# Patient Record
Sex: FEMALE
[De-identification: names, ages, dates, MRNs, and addresses within clinical notes are randomized per-mention and may not be internally consistent; named-entity substitution may affect disease eponyms.]

---

## 2022-01-31 PROBLEM — Z00.00 ENCOUNTER FOR PREVENTIVE HEALTH EXAMINATION: Status: ACTIVE | Noted: 2022-01-31

## 2022-03-31 ENCOUNTER — APPOINTMENT (OUTPATIENT)
Dept: NEUROSURGERY | Facility: CLINIC | Age: 32
End: 2022-03-31
Payer: SELF-PAY

## 2022-03-31 ENCOUNTER — APPOINTMENT (OUTPATIENT)
Dept: NEUROSURGERY | Facility: CLINIC | Age: 32
End: 2022-03-31

## 2022-03-31 DIAGNOSIS — H93.A9 PULSATILE TINNITUS, UNSPECIFIED EAR: ICD-10-CM

## 2022-03-31 PROCEDURE — EDU01: CPT

## 2022-03-31 NOTE — HISTORY OF PRESENT ILLNESS
[de-identified] : Pulsatile tinnitus\par - initially on LEFT; started 3-4 years ago\par - eventually bilateral\par - bothersome especially at night\par - improves with neck pressure\par \par Headaches\par - occasional\par - no specific pattern\par \par Vision\par - evaluated by ophthalmologist in [place], no papilledema\par \par Prescribed Diamox but could not tolerate due to fatigue, pins and needles\par \par Describes episodes when she might faint\par \par MRI: unremarkable (limited images)\par MRA: unremarkable (limited images) [FreeTextEntry1] : Pulsatile Tinnitus

## 2022-03-31 NOTE — PLAN
[FreeTextEntry1] : Based on her symptoms, I suspect she has venous sinus stenosis.\par \par This needs to be investigated further with MRV Head so we can determine next steps\par \par PLAN\par MRV Head with and without contrast\par Follow-up after the MRV

## 2024-05-02 ENCOUNTER — APPOINTMENT (OUTPATIENT)
Dept: ANTEPARTUM | Facility: CLINIC | Age: 34
End: 2024-05-02
Payer: COMMERCIAL

## 2024-05-02 ENCOUNTER — ASOB RESULT (OUTPATIENT)
Age: 34
End: 2024-05-02

## 2024-05-02 PROCEDURE — 36415 COLL VENOUS BLD VENIPUNCTURE: CPT

## 2024-05-02 PROCEDURE — 76813 OB US NUCHAL MEAS 1 GEST: CPT

## 2024-05-02 PROCEDURE — 93976 VASCULAR STUDY: CPT

## 2024-06-20 ENCOUNTER — APPOINTMENT (OUTPATIENT)
Dept: ANTEPARTUM | Facility: CLINIC | Age: 34
End: 2024-06-20
Payer: COMMERCIAL

## 2024-06-20 ENCOUNTER — ASOB RESULT (OUTPATIENT)
Age: 34
End: 2024-06-20

## 2024-06-20 PROCEDURE — 76817 TRANSVAGINAL US OBSTETRIC: CPT

## 2024-06-20 PROCEDURE — 76811 OB US DETAILED SNGL FETUS: CPT

## 2024-08-15 ENCOUNTER — ASOB RESULT (OUTPATIENT)
Age: 34
End: 2024-08-15

## 2024-08-15 ENCOUNTER — APPOINTMENT (OUTPATIENT)
Dept: ANTEPARTUM | Facility: CLINIC | Age: 34
End: 2024-08-15
Payer: COMMERCIAL

## 2024-08-15 PROCEDURE — 76820 UMBILICAL ARTERY ECHO: CPT | Mod: 59

## 2024-08-15 PROCEDURE — 76816 OB US FOLLOW-UP PER FETUS: CPT

## 2024-08-15 PROCEDURE — 76819 FETAL BIOPHYS PROFIL W/O NST: CPT | Mod: 59

## 2024-09-05 ENCOUNTER — ASOB RESULT (OUTPATIENT)
Age: 34
End: 2024-09-05

## 2024-09-05 ENCOUNTER — APPOINTMENT (OUTPATIENT)
Dept: ANTEPARTUM | Facility: CLINIC | Age: 34
End: 2024-09-05
Payer: COMMERCIAL

## 2024-09-05 PROCEDURE — 76819 FETAL BIOPHYS PROFIL W/O NST: CPT | Mod: 59

## 2024-09-05 PROCEDURE — 76820 UMBILICAL ARTERY ECHO: CPT | Mod: 59

## 2024-09-05 PROCEDURE — 76816 OB US FOLLOW-UP PER FETUS: CPT

## 2024-09-19 ENCOUNTER — ASOB RESULT (OUTPATIENT)
Age: 34
End: 2024-09-19

## 2024-09-19 ENCOUNTER — APPOINTMENT (OUTPATIENT)
Dept: ANTEPARTUM | Facility: CLINIC | Age: 34
End: 2024-09-19
Payer: COMMERCIAL

## 2024-09-19 PROCEDURE — 76820 UMBILICAL ARTERY ECHO: CPT | Mod: 59

## 2024-09-19 PROCEDURE — 76819 FETAL BIOPHYS PROFIL W/O NST: CPT | Mod: 59

## 2024-09-19 PROCEDURE — 76816 OB US FOLLOW-UP PER FETUS: CPT

## 2024-10-03 ENCOUNTER — APPOINTMENT (OUTPATIENT)
Dept: ANTEPARTUM | Facility: CLINIC | Age: 34
End: 2024-10-03

## 2024-10-15 ENCOUNTER — ASOB RESULT (OUTPATIENT)
Age: 34
End: 2024-10-15

## 2024-10-15 ENCOUNTER — APPOINTMENT (OUTPATIENT)
Dept: ANTEPARTUM | Facility: CLINIC | Age: 34
End: 2024-10-15
Payer: COMMERCIAL

## 2024-10-15 PROCEDURE — 76821 MIDDLE CEREBRAL ARTERY ECHO: CPT | Mod: 59

## 2024-10-15 PROCEDURE — 76820 UMBILICAL ARTERY ECHO: CPT | Mod: 59

## 2024-10-15 PROCEDURE — 76816 OB US FOLLOW-UP PER FETUS: CPT

## 2024-10-15 PROCEDURE — 76819 FETAL BIOPHYS PROFIL W/O NST: CPT

## 2024-10-24 ENCOUNTER — ASOB RESULT (OUTPATIENT)
Age: 34
End: 2024-10-24

## 2024-10-24 ENCOUNTER — APPOINTMENT (OUTPATIENT)
Dept: ANTEPARTUM | Facility: CLINIC | Age: 34
End: 2024-10-24
Payer: COMMERCIAL

## 2024-10-24 PROCEDURE — 76815 OB US LIMITED FETUS(S): CPT

## 2024-10-24 PROCEDURE — 76819 FETAL BIOPHYS PROFIL W/O NST: CPT

## 2024-10-24 PROCEDURE — 76820 UMBILICAL ARTERY ECHO: CPT

## 2024-10-30 ENCOUNTER — INPATIENT (INPATIENT)
Facility: HOSPITAL | Age: 34
LOS: 3 days | Discharge: HOME | DRG: 833 | End: 2024-11-03
Attending: OBSTETRICS & GYNECOLOGY | Admitting: OBSTETRICS & GYNECOLOGY
Payer: COMMERCIAL

## 2024-10-30 ENCOUNTER — EMERGENCY (EMERGENCY)
Facility: HOSPITAL | Age: 34
LOS: 1 days | Discharge: HOME | End: 2024-10-30
Attending: EMERGENCY MEDICINE | Admitting: EMERGENCY MEDICINE
Payer: COMMERCIAL

## 2024-10-30 VITALS
TEMPERATURE: 98 F | WEIGHT: 151.02 LBS | HEIGHT: 61 IN | RESPIRATION RATE: 18 BRPM | OXYGEN SATURATION: 99 % | HEART RATE: 74 BPM | DIASTOLIC BLOOD PRESSURE: 90 MMHG | SYSTOLIC BLOOD PRESSURE: 133 MMHG

## 2024-10-30 VITALS
TEMPERATURE: 98 F | DIASTOLIC BLOOD PRESSURE: 87 MMHG | RESPIRATION RATE: 18 BRPM | SYSTOLIC BLOOD PRESSURE: 118 MMHG | OXYGEN SATURATION: 100 % | HEART RATE: 87 BPM

## 2024-10-30 DIAGNOSIS — O14.93 UNSPECIFIED PRE-ECLAMPSIA, THIRD TRIMESTER: ICD-10-CM

## 2024-10-30 DIAGNOSIS — O26.899 OTHER SPECIFIED PREGNANCY RELATED CONDITIONS, UNSPECIFIED TRIMESTER: ICD-10-CM

## 2024-10-30 DIAGNOSIS — Z3A.38 38 WEEKS GESTATION OF PREGNANCY: ICD-10-CM

## 2024-10-30 DIAGNOSIS — O99.891 OTHER SPECIFIED DISEASES AND CONDITIONS COMPLICATING PREGNANCY: ICD-10-CM

## 2024-10-30 DIAGNOSIS — R51.9 HEADACHE, UNSPECIFIED: ICD-10-CM

## 2024-10-30 LAB
ALBUMIN SERPL ELPH-MCNC: 3.7 G/DL — SIGNIFICANT CHANGE UP (ref 3.3–5)
ALP SERPL-CCNC: 187 U/L — HIGH (ref 40–120)
ALT FLD-CCNC: 9 U/L — LOW (ref 10–45)
ANION GAP SERPL CALC-SCNC: 11 MMOL/L — SIGNIFICANT CHANGE UP (ref 5–17)
ANISOCYTOSIS BLD QL: SLIGHT — SIGNIFICANT CHANGE UP
APTT BLD: 27.3 SEC — SIGNIFICANT CHANGE UP (ref 24.5–35.6)
AST SERPL-CCNC: 17 U/L — SIGNIFICANT CHANGE UP (ref 10–40)
BASOPHILS # BLD AUTO: 0.1 K/UL — SIGNIFICANT CHANGE UP (ref 0–0.2)
BASOPHILS NFR BLD AUTO: 1.8 % — SIGNIFICANT CHANGE UP (ref 0–2)
BILIRUB SERPL-MCNC: 0.3 MG/DL — SIGNIFICANT CHANGE UP (ref 0.2–1.2)
BLD GP AB SCN SERPL QL: NEGATIVE — SIGNIFICANT CHANGE UP
BLD GP AB SCN SERPL QL: NEGATIVE — SIGNIFICANT CHANGE UP
BLD GP AB SCN SERPL QL: POSITIVE — SIGNIFICANT CHANGE UP
BUN SERPL-MCNC: 13 MG/DL — SIGNIFICANT CHANGE UP (ref 7–23)
CALCIUM SERPL-MCNC: 9 MG/DL — SIGNIFICANT CHANGE UP (ref 8.4–10.5)
CHLORIDE SERPL-SCNC: 106 MMOL/L — SIGNIFICANT CHANGE UP (ref 96–108)
CO2 SERPL-SCNC: 18 MMOL/L — LOW (ref 22–31)
CREAT ?TM UR-MCNC: 25 MG/DL — SIGNIFICANT CHANGE UP
CREAT SERPL-MCNC: 0.79 MG/DL — SIGNIFICANT CHANGE UP (ref 0.5–1.3)
DACRYOCYTES BLD QL SMEAR: SLIGHT — SIGNIFICANT CHANGE UP
EGFR: 101 ML/MIN/1.73M2 — SIGNIFICANT CHANGE UP
EOSINOPHIL # BLD AUTO: 0.1 K/UL — SIGNIFICANT CHANGE UP (ref 0–0.5)
EOSINOPHIL NFR BLD AUTO: 1.8 % — SIGNIFICANT CHANGE UP (ref 0–6)
FIBRINOGEN PPP-MCNC: 545 MG/DL — HIGH (ref 200–445)
GIANT PLATELETS BLD QL SMEAR: PRESENT — SIGNIFICANT CHANGE UP
GLUCOSE SERPL-MCNC: 89 MG/DL — SIGNIFICANT CHANGE UP (ref 70–99)
HCT VFR BLD CALC: 37.5 % — SIGNIFICANT CHANGE UP (ref 34.5–45)
HGB BLD-MCNC: 12.3 G/DL — SIGNIFICANT CHANGE UP (ref 11.5–15.5)
HYPOCHROMIA BLD QL: SLIGHT — SIGNIFICANT CHANGE UP
INR BLD: 0.84 — LOW (ref 0.85–1.16)
LDH SERPL L TO P-CCNC: 207 U/L — SIGNIFICANT CHANGE UP (ref 50–242)
LYMPHOCYTES # BLD AUTO: 2.16 K/UL — SIGNIFICANT CHANGE UP (ref 1–3.3)
LYMPHOCYTES # BLD AUTO: 38.6 % — SIGNIFICANT CHANGE UP (ref 13–44)
MANUAL SMEAR VERIFICATION: SIGNIFICANT CHANGE UP
MCHC RBC-ENTMCNC: 28 PG — SIGNIFICANT CHANGE UP (ref 27–34)
MCHC RBC-ENTMCNC: 32.8 G/DL — SIGNIFICANT CHANGE UP (ref 32–36)
MCV RBC AUTO: 85.4 FL — SIGNIFICANT CHANGE UP (ref 80–100)
MICROCYTES BLD QL: SLIGHT — SIGNIFICANT CHANGE UP
MONOCYTES # BLD AUTO: 0.54 K/UL — SIGNIFICANT CHANGE UP (ref 0–0.9)
MONOCYTES NFR BLD AUTO: 9.6 % — SIGNIFICANT CHANGE UP (ref 2–14)
NEUTROPHILS # BLD AUTO: 2.69 K/UL — SIGNIFICANT CHANGE UP (ref 1.8–7.4)
NEUTROPHILS NFR BLD AUTO: 48.2 % — SIGNIFICANT CHANGE UP (ref 43–77)
OVALOCYTES BLD QL SMEAR: SIGNIFICANT CHANGE UP
PLAT MORPH BLD: ABNORMAL
PLATELET # BLD AUTO: 224 K/UL — SIGNIFICANT CHANGE UP (ref 150–400)
POIKILOCYTOSIS BLD QL AUTO: SIGNIFICANT CHANGE UP
POLYCHROMASIA BLD QL SMEAR: SLIGHT — SIGNIFICANT CHANGE UP
POTASSIUM SERPL-MCNC: 4.2 MMOL/L — SIGNIFICANT CHANGE UP (ref 3.5–5.3)
POTASSIUM SERPL-SCNC: 4.2 MMOL/L — SIGNIFICANT CHANGE UP (ref 3.5–5.3)
PROT ?TM UR-MCNC: 4 MG/DL — SIGNIFICANT CHANGE UP (ref 0–12)
PROT SERPL-MCNC: 7.4 G/DL — SIGNIFICANT CHANGE UP (ref 6–8.3)
PROT/CREAT UR-RTO: 0.2 RATIO — SIGNIFICANT CHANGE UP (ref 0–0.2)
PROTHROM AB SERPL-ACNC: 9.8 SEC — LOW (ref 9.9–13.4)
RBC # BLD: 4.39 M/UL — SIGNIFICANT CHANGE UP (ref 3.8–5.2)
RBC # FLD: 13.6 % — SIGNIFICANT CHANGE UP (ref 10.3–14.5)
RBC BLD AUTO: ABNORMAL
RH IG SCN BLD-IMP: POSITIVE — SIGNIFICANT CHANGE UP
RH IG SCN BLD-IMP: POSITIVE — SIGNIFICANT CHANGE UP
SODIUM SERPL-SCNC: 135 MMOL/L — SIGNIFICANT CHANGE UP (ref 135–145)
SPHEROCYTES BLD QL SMEAR: SLIGHT — SIGNIFICANT CHANGE UP
URATE SERPL-MCNC: 6.5 MG/DL — SIGNIFICANT CHANGE UP (ref 2.5–7)
WBC # BLD: 5.59 K/UL — SIGNIFICANT CHANGE UP (ref 3.8–10.5)
WBC # FLD AUTO: 5.59 K/UL — SIGNIFICANT CHANGE UP (ref 3.8–10.5)

## 2024-10-30 PROCEDURE — 99291 CRITICAL CARE FIRST HOUR: CPT

## 2024-10-30 RX ORDER — CHLORHEXIDINE GLUCONATE 40 MG/ML
1 SOLUTION TOPICAL DAILY
Refills: 0 | Status: DISCONTINUED | OUTPATIENT
Start: 2024-10-30 | End: 2024-10-31

## 2024-10-30 RX ORDER — OXYTOCIN IN D5W-0.2% SODIUM CL 15/250 ML
PLASTIC BAG, INJECTION (ML) INTRAVENOUS
Qty: 30 | Refills: 0 | Status: DISCONTINUED | OUTPATIENT
Start: 2024-10-30 | End: 2024-10-31

## 2024-10-30 RX ORDER — CITRIC ACID/SODIUM CITRATE 334-500MG
15 SOLUTION, ORAL ORAL EVERY 6 HOURS
Refills: 0 | Status: DISCONTINUED | OUTPATIENT
Start: 2024-10-30 | End: 2024-10-31

## 2024-10-30 RX ORDER — METOCLOPRAMIDE HCL 10 MG
10 TABLET ORAL ONCE
Refills: 0 | Status: COMPLETED | OUTPATIENT
Start: 2024-10-30 | End: 2024-10-30

## 2024-10-30 RX ORDER — DEXAMETHASONE 1.5 MG 1.5 MG/1
4 TABLET ORAL EVERY 6 HOURS
Refills: 0 | Status: DISCONTINUED | OUTPATIENT
Start: 2024-10-30 | End: 2024-10-31

## 2024-10-30 RX ORDER — OXYTOCIN IN D5W-0.2% SODIUM CL 15/250 ML
167 PLASTIC BAG, INJECTION (ML) INTRAVENOUS
Qty: 30 | Refills: 0 | Status: DISCONTINUED | OUTPATIENT
Start: 2024-10-30 | End: 2024-10-31

## 2024-10-30 RX ORDER — ACETAMINOPHEN 500 MG
1000 TABLET ORAL ONCE
Refills: 0 | Status: COMPLETED | OUTPATIENT
Start: 2024-10-30 | End: 2024-10-30

## 2024-10-30 RX ORDER — FENTANYL/BUPIVACAINE/NS/PF 2-1250MCG
250 SYRINGE (ML) EPIDURAL
Refills: 0 | Status: DISCONTINUED | OUTPATIENT
Start: 2024-10-30 | End: 2024-10-31

## 2024-10-30 RX ORDER — ONDANSETRON HYDROCHLORIDE 2 MG/ML
4 INJECTION, SOLUTION INTRAMUSCULAR; INTRAVENOUS EVERY 6 HOURS
Refills: 0 | Status: DISCONTINUED | OUTPATIENT
Start: 2024-10-30 | End: 2024-10-31

## 2024-10-30 RX ORDER — INFLUENZ VIR VAC TV P-SURF2003 15MCG/.5ML
0.5 SYRINGE (ML) INTRAMUSCULAR ONCE
Refills: 0 | Status: DISCONTINUED | OUTPATIENT
Start: 2024-10-30 | End: 2024-10-31

## 2024-10-30 RX ORDER — NALOXONE HYDROCHLORIDE 1 MG/ML
0.1 INJECTION, SOLUTION INTRAMUSCULAR; INTRAVENOUS; SUBCUTANEOUS
Refills: 0 | Status: DISCONTINUED | OUTPATIENT
Start: 2024-10-30 | End: 2024-10-31

## 2024-10-30 RX ORDER — METOCLOPRAMIDE HCL 10 MG
10 TABLET ORAL ONCE
Refills: 0 | Status: DISCONTINUED | OUTPATIENT
Start: 2024-10-30 | End: 2024-10-31

## 2024-10-30 RX ADMIN — Medication 2 MILLIUNIT(S)/MIN: at 14:45

## 2024-10-30 RX ADMIN — Medication 10 MILLIGRAM(S): at 10:00

## 2024-10-30 RX ADMIN — Medication 125 MILLILITER(S): at 02:15

## 2024-10-30 RX ADMIN — Medication 125 MILLILITER(S): at 01:51

## 2024-10-30 RX ADMIN — Medication 400 MILLIGRAM(S): at 04:45

## 2024-10-30 NOTE — ED ADULT NURSE NOTE - PATIENT'S PREFERRED PRONOUN
HUMIRA Biologic Injectable Administration     Additional History of Present Condition:  Patient is present for education and administration of Humira. Medication administration order placed . Provider order matches prescription order.     Assessment and Plan:  Based on a thorough discussion of this condition and the management approach to it (including a comprehensive discussion of the known risks, side effects and potential benefits of treatment), the patient (family) agrees to implement the following specific plan:  First  Humira injection administered today in the right abdomen  Verbal consent obtained to administer.   Next dose due 10/8, then 10/22   Patient provided education and training to continue to administer this at home.   Side effects discussed and how to report  Schedule 6 month follow up with ordering provider.       Biologic Injectable Administration Note  Diagnosis: Psoriasis  This is injection number 1    Informed consent: Discussed risks (Risks of hypersensitivity reaction, injection site reaction, conjunctivitis/keratitis, HSV reactivation, increased susceptibility to parasitic infections, inefficacy were reviewed.) Verbal consent obtained.   Preparation: After discussion potential procedure related risks including pain, bleeding, new infection, reactivation of latent infection, inefficacy, increased risk of malignancy, hypersensitivity reaction, injection site reaction, verbal consent was obtained. The areas were cleansed with alcohol prep pads and allowed to fully air dry for 3 minutes.  Procedure Details:  80mg was injected subcutaneously in the right abd  Lot Number: [id number]  Expiration: 09/01/2025  Total Injected: 80mg  NDC: 5946-2502-82     Patient tolerated procedure well, with minimal pinpoint bleeding that was controlled with pressure. Aftercare was reviewed.         IMPORTANT SAFETY INFORMATION   ABOUT HUMIRA® (adalimumab)1  What is the most important information I should know about  HUMIRA?  You should discuss the potential benefits and risks of HUMIRA with your doctor. HUMIRA is a TNF blocker medicine that can lower the ability of your immune system to fight infections. You should not start taking HUMIRA if you have any kind of infection unless your doctor says it is okay.  Serious infections have happened in people taking HUMIRA. These serious infections include tuberculosis (TB) and infections caused by viruses, fungi, or bacteria that have spread throughout the body. Some people have  from these infections. Your doctor should test you for TB before starting HUMIRA, and check you closely for signs and symptoms of TB during treatment with HUMIRA, even if your TB test was negative. If your doctor feels you are at risk, you may be treated with medicine for TB.  Cancer. For children and adults taking TNF blockers, including HUMIRA, the chance of getting lymphoma or other cancers may increase. There have been cases of unusual cancers in children, teenagers, and young adults using TNF blockers. Some people have developed a rare type of cancer called hepatosplenic T-cell lymphoma. This type of cancer often results in death. If using TNF blockers including HUMIRA, your chance of getting two types of skin cancer (basal cell and squamous cell) may increase. These types are generally not life-threatening if treated; tell your doctor if you have a bump or open sore that doesn’t heal.  What should I tell my doctor BEFORE starting HUMIRA?  Tell your doctor about all of your health conditions, including if you:  Have an infection, are being treated for infection, or have symptoms of an infection  Get a lot of infections or infections that keep coming back  Have diabetes  Have TB or have been in close contact with someone with TB, or were born in, lived in, or traveled where there is more risk for getting TB  Live or have lived in an area (such as the [place] and [facility]) where there is  an increased risk for getting certain kinds of fungal infections, such as histoplasmosis, coccidioidomycosis, or blastomycosis. These infections may happen or become more severe if you use HUMIRA. Ask your doctor if you are unsure if you have lived in these areas  Have or have had hepatitis B  Are scheduled for major surgery  Have or have had cancer  Have numbness or tingling or a nervous system disease such as multiple sclerosis or Guillain-Barré syndrome  Have or had heart failure  Have recently received or are scheduled to receive a vaccine. HUMIRA patients may receive vaccines, except for live vaccines. Children should be brought up to date on all vaccines before starting HUMIRA  Are allergic to rubber, latex, or any HUMIRA ingredients  Are pregnant, planning to become pregnant, breastfeeding, or planning to breastfeed  Have a baby and you were using HUMIRA during your pregnancy. Tell your baby’s doctor before your baby receives any vaccines  Also tell your doctor about all the medicines you take. You should not take HUMIRA with ORENCIA® (abatacept), KINERET® (anakinra), REMICADE® (infliximab), ENBREL® (etanercept), CIMZIA® (certolizumab pegol), or SIMPONI® (golimumab). Tell your doctor if you have ever used RITUXAN® (rituximab), IMURAN® (azathioprine), or PURINETHOL® (mercaptopurine, 6-MP).  What should I watch for AFTER starting HUMIRA?  HUMIRA can cause serious side effects, including:  Serious infections. These include TB and infections caused by viruses, fungi, or bacteria. Symptoms related to TB include a cough, low-grade fever, weight loss, or loss of body fat and muscle.  Hepatitis B infection in carriers of the virus. Symptoms include muscle aches, feeling very tired, dark urine, skin or eyes that look yellow, little or no appetite, vomiting, rogerio-colored bowel movements, fever, chills, stomach discomfort, and skin rash.  Allergic reactions. Symptoms of a serious allergic reaction include hives, trouble  breathing, and swelling of your face, eyes, lips, or mouth.  Nervous system problems. Signs and symptoms include numbness or tingling, problems with your vision, weakness in your arms or legs, and dizziness.  Blood problems (decreased blood cells that help fight infections or stop bleeding). Symptoms include a fever that does not go away, bruising or bleeding very easily, or looking very pale.  Heart failure (new or worsening). Symptoms include shortness of breath, swelling of your ankles or feet, and sudden weight gain.  Immune reactions including a lupus-like syndrome. Symptoms include chest discomfort or pain that does not go away, shortness of breath, joint pain, or rash on your cheeks or arms that gets worse in the sun.  Liver problems. Symptoms include feeling very tired, skin or eyes that look yellow, poor appetite or vomiting, and pain on the right side of your stomach (abdomen). These problems can lead to liver failure and death.  Psoriasis (new or worsening). Symptoms include red scaly patches or raised bumps that are filled with pus.  Call your doctor or get medical care right away if you develop any of the above symptoms.  Common side effects of HUMIRA include injection site reactions (pain, redness, rash, swelling, itching, or bruising), upper respiratory infections (sinus infections), headaches, rash, and nausea. These are not all of the possible side effects with HUMIRA. Tell your doctor if you have any side effect that bothers you or that does not go away.  Remember, tell your doctor right away if you have an infection or symptoms of an infection, including:  Fever, sweats, or chills  Muscle aches  Cough  Shortness of breath  Blood in phlegm  Weight loss  Warm, red, or painful skin or sores on your body  Diarrhea or stomach pain  Burning when you urinate  Urinating more often than normal  Feeling very tired  HUMIRA is given by injection under the skin.  This is the most important information to know  about HUMIRA. For more information, talk to your health care provider.  Uses  HUMIRA is a prescription medicine used:  To reduce the signs and symptoms of:  Moderate to severe rheumatoid arthritis (RA) in adults. HUMIRA can be used alone, with methotrexate, or with certain other medicines. HUMIRA may prevent further damage to your bones and joints and may help your ability to perform daily activities.  Moderate to severe polyarticular juvenile idiopathic arthritis (ULISSES) in children 2 years of age and older. HUMIRA can be used alone or with methotrexate.  Psoriatic arthritis (PsA) in adults. HUMIRA can be used alone or with certain other medicines. HUMIRA may prevent further damage to your bones and joints and may help your ability to perform daily activities.  Ankylosing spondylitis (AS) in adults.  Moderate to severe hidradenitis suppurativa (HS) in people 12 years and older.  To treat moderate to severe Crohn’s disease (CD) in adults and children 6 years of age and older.  To treat moderate to severe ulcerative colitis (UC) in adults and children 5 years of age and older. It is not known if HUMIRA is effective in people who stopped responding to or could not tolerate anti-TNF medicines.  To treat moderate to severe chronic plaque psoriasis (Ps) in adults who are ready for systemic therapy or phototherapy, and are under the care of a doctor who will decide if other systemic therapies are less appropriate.  To treat non-infectious intermediate (middle part of the eye), posterior (back of the eye), and panuveitis (all parts of the eye) in adults and children 2 years of age and older.  [id number]     Her/She

## 2024-10-30 NOTE — ED PROVIDER NOTE - CLINICAL SUMMARY MEDICAL DECISION MAKING FREE TEXT BOX
38-week pregnant female discussed with OB/GYN will send for further workup upstairs hemodynamically stable neurologically intact

## 2024-10-30 NOTE — ED ADULT TRIAGE NOTE - CHIEF COMPLAINT QUOTE
Elevated BP of 133/90 at home. pt. reports 38 weeks of pregnancy with due date on november 8. pt. denies pain, cramping, d/c or bleeding or swelling.

## 2024-10-30 NOTE — OB PROVIDER H&P - ATTENDING COMMENTS
Patient has been counseled multiple times that the standard of care after this type of deceleration would be to induce as she is full term and has multiple decelerations at this time. Pt has denied induction, tylenol, reglan and was observed overnight.  At 4 am we spoke and I explained why tylenol at least was important and patient consented. She continues to decline induction and was offered continued monitoring. Hopeful that she will change her mind. Will follow closely with residents and nursing. If patient leaves she was told that she does need to sign out AMA

## 2024-10-30 NOTE — ED PROVIDER NOTE - PATIENT PORTAL LINK FT
You can access the FollowMyHealth Patient Portal offered by [facility] by registering at the following website: [email or web address]. By joining [employer]’s FollowMyHealth portal, you will also be able to view your health information using other applications (apps) compatible with our system.

## 2024-10-30 NOTE — OB PROVIDER TRIAGE NOTE - HISTORY OF PRESENT ILLNESS
HPI: 33 yo  at 38w5d presenting with    ANTE: Spontaneous pregnancy. NIPT and anatomy scan WNL, L renal pyelectasis measuring 7.4mm noted on U/S 10/24. GCT passed. GBS negative. Denies HTN or thyroid disorders. EFW 2668g by U/S on 10/15, 3200g by leopolds today.    OB: G1 - current  GynHx: Denies fibroids, ovarian cysts, STI's, or abnormal PAP.  PMHx: Denies  PSurgHx: Denies  Medications: PNV  ALL: NKDA    BP: 118-122/87  HR: 81-92  Gen: NAD, A&Ox3  Abd: non-tender, gravid  LE: no swelling or pitting edema  Skin: no excoriations or rashes  Pulm: no increased WOB  SVE:    FHT: Cat 1, FHR bpm, moderate variability, + accels, - decels.  [place]: [place] q  TAUS: Cephalic, anterior/posterior placenta. U/S printed and attached to paper chart.     A/P: 33 yo  at 38w5d presenting for r/o PEC.  - NST reactive and reassuring  - BPP  - BP's are normotensive here, MR x1 in the ED  - PEC labs pending      **INCOMPLETE NOTE**  D/W  Eliane Leal PA-C HPI: 33 yo  at 38w5d presenting with a HA that started this morning. Patient states that she did not take tylenol for relief and it still persists at a 5/10. Patient was seen in the ED and found to have a BP of 130/90 and was sent to OB triage for evaluation. Endorses FM. Denies VB, LOF, ctx, hx of elevated BP's, CP/COB, RUQ pain, acute LE edema, or visual changes. Upon patient interview, a 2 minute deceleration was noted, patient examined and was 1/long, repositioned to her L then R side, IV inserted, IVF started, and was confirmed vertex presentation on U/S.     ANTE: Spontaneous pregnancy. NIPT and anatomy scan WNL, L renal pyelectasis measuring 7.4mm noted on U/S 10/24. GCT passed. GBS negative. Denies HTN or thyroid disorders. EFW 2668g by U/S on 10/15, 3200g by leopolds today.    OB: G1 - current  GynHx: 2 cm anterior subserosal/intramural fibroid. Deniesovarian cysts, STI's, or abnormal PAP.  PMHx: Denies  PSurgHx: Denies  Medications: PNV, ASA until 36 weeks, PO iron  ALL: NKDA    BP: 118-130/93  HR: 81-92  Gen: NAD, A&Ox3  Abd: non-tender, gravid  LE: no swelling or pitting edema  Skin: no excoriations or rashes  Pulm: no increased WOB  SVE: 1/long    FHT: Cat 2,  bpm, moderate variability overall reassuring, + accels reactive, + late decels with return to FHR baseline with repositioning.  [place]: Uterine irritability  TAUS: Cephalic, anterior/posterior placenta. U/S printed and attached to paper chart.     A/P: 33 yo  at 38w5d presenting for r/o PEC, found to have a NRFHT while in triage.   - Plan: Admit to L&D  - PEC labs pending    D/W Dr. Trey Leal PA-C

## 2024-10-30 NOTE — ED PROVIDER NOTE - CRITICAL CARE ATTENDING CONTRIBUTION TO CARE
critical Care Procedure Note  Authorized and Performed by:   DO [facility] [place]  Total critical care time: Approximately 36 minutes  Due to a high probability of clinically significant, life threatening deterioration, the patient required my highest level of preparedness to intervene emergently and I personally spent this critical care time directly and personally managing the patient. This critical care time included obtaining a history; examining the patient; pulse oximetry; ordering and review of studies; arranging urgent treatment with development of a management plan; evaluation of patient's response to treatment; frequent reassessment; and, discussions with other providers.  This critical care time was performed to assess and manage the high probability of imminent, life-threatening deterioration that could result in multi-organ failure. It was exclusive of separately billable procedures and treating other patients and teaching time.  Please see MDM section and the rest of the note for further information on patient assessment and treatment.

## 2024-10-30 NOTE — OB RN TRIAGE NOTE - TEMPERATURE IN CELSIUS (DEGREES C)
36.5 Cellcept Counseling:  I discussed with the patient the risks of mycophenolate mofetil including but not limited to infection/immunosuppression, GI upset, hypokalemia, hypercholesterolemia, bone marrow suppression, lymphoproliferative disorders, malignancy, GI ulceration/bleed/perforation, colitis, interstitial lung disease, kidney failure, progressive multifocal leukoencephalopathy, and birth defects.  The patient understands that monitoring is required including a baseline creatinine and regular CBC testing. In addition, patient must alert us immediately if symptoms of infection or other concerning signs are noted.

## 2024-10-30 NOTE — OB RN DELIVERY SUMMARY - NS_SEPSISRSKCALC_OBGYN_ALL_OB_FT
EOS calculated successfully. EOS Risk Factor: 0.5/1000 live births ([facility] national incidence); GA=38w6d; Temp=100.2; ROM=21.333; GBS='Negative'; Antibiotics='No antibiotics or any antibiotics < 2 hrs prior to birth'

## 2024-10-30 NOTE — ED ADULT NURSE NOTE - NSFALLRISKASMT_ED_ALL_ED_DT
DISPLAY PLAN FREE TEXT DISPLAY PLAN FREE TEXT DISPLAY PLAN FREE TEXT DISPLAY PLAN FREE TEXT 30-Oct-2024 01:04

## 2024-10-30 NOTE — OB PROVIDER LABOR PROGRESS NOTE - NS_SUBJECTIVE/OBJECTIVE_OBGYN_ALL_OB_FT
FHT reviewed  Baseline 125 bpm, moderate variability, +accels, -decels  [place]: 1 ctx q10 min  Patient declined induction all morning. She was admitted from triage after having prolonged deceleration of 4 min.   No intervention done so far, FHT overall reactive and reassuring  Plan to start IOL with cook balloon and pitocin if patient agrees

## 2024-10-30 NOTE — OB PROVIDER LABOR PROGRESS NOTE - NS_SUBJECTIVE/OBJECTIVE_OBGYN_ALL_OB_FT
Pt seen at bedside for post epidural decel  Cervical balloon out, SVE 5-6/70/-3, likely SROM at 1740 clear fluid  BP 70s/40s, anesthesia at bedside, given ephedrine   Pt repositioned to hands and knees with return to baseline   Dr. Rene at bedside   EFM remains with moderate variability. No accels. Decel 5-6min tp a marbella of 70 bpm. Noted to be karishma. Terbutaline brought into room but not given.   Dr. Yang aware.

## 2024-10-30 NOTE — OB PROVIDER H&P - HISTORY OF PRESENT ILLNESS
HPI: 33 yo  at 38w5d presenting with a HA that started this morning. Patient states that she did not take Tylenol for relief and it still persists at a 5/10. Patient was seen in the ED and found to have a BP of 130/90 and was sent to OB triage for evaluation. Endorses FM. Denies VB, LOF, ctx, hx of elevated BP's, CP/COB, RUQ pain, acute LE edema, or visual changes. Upon patient interview, a 2 minute deceleration was noted, patient examined and was 1/long, repositioned to her L then R side, IV inserted, IVF started, and was confirmed vertex presentation on U/S.     ANTE: Spontaneous pregnancy. NIPT and anatomy scan WNL, L renal pyelectasis measuring 7.4mm noted on U/S 10/24. GCT passed. GBS negative. Denies HTN or thyroid disorders. EFW 2668g by U/S on 10/15, 3200g by leopolds today.    OB: G1 - current  GynHx: 2 cm anterior subserosal/intramural fibroid. Deniesovarian cysts, STI's, or abnormal PAP.  PMHx: Denies  PSurgHx: Denies  Medications: PNV, ASA until 36 weeks, PO iron  ALL: NKDA    BP: 118-130/93  HR: 81-92  Gen: NAD, A&Ox3  Abd: non-tender, gravid  LE: no swelling or pitting edema  Skin: no excoriations or rashes  Pulm: no increased WOB  SVE: 1/long    FHT: Cat 2,  bpm, moderate variability overall reassuring, + accels reactive, + late decels with return to FHR baseline with repositioning.  [place]: Uterine irritability  TAUS: Cephalic, anterior/posterior placenta. U/S printed and attached to paper chart.     A/P: 33 yo  at 38w5d presenting for r/o PEC, found to have a NRFHT while in triage.   - IV fluids, NPO  - Admit to L&D  - Continuous FHT and toco monitoring. Currently reactive and reassuring.  - Prenatals reviewed. GBS negative. No indication for antibiotic prophylaxis.  - r/o PEC: Full PEC labs pending, ofirmev and reglan ordered for HA, monitor BP's  - Plan: Evaluate FHT. With Cat 1 tracing proceed with IOL for NRFHT with kinney balloon and pitocin, epidural upon patient request.  - Risks, benefits, and alternatives discussed. Consents obtained.    D/W Dr. Trey Leal, CEM    D/W TOY De GuzmanC HPI: 33 yo  at 38w5d presenting with a HA that started this morning. Patient states that she did not take Tylenol for relief and it still persists at a 5/10. Patient was seen in the ED and found to have a BP of 130/90 and was sent to OB triage for evaluation. Endorses FM. Denies VB, LOF, ctx, hx of elevated BP's, CP/COB, RUQ pain, acute LE edema, or visual changes. Upon patient interview, a 2 minute deceleration was noted, patient examined and was 1/long, repositioned to her L then R side, IV inserted, IVF started, and was confirmed vertex presentation on U/S.     ANTE: Spontaneous pregnancy. NIPT and anatomy scan WNL, L renal pyelectasis measuring 7.4mm noted on U/S 10/24. GCT passed. GBS negative. Denies HTN or thyroid disorders. EFW 2668g by U/S on 10/15, 3000g by leopolds today.    OB: G1 - current  GynHx: 2 cm anterior subserosal/intramural fibroid. Deniesovarian cysts, STI's, or abnormal PAP.  PMHx: Denies  PSurgHx: Denies  Medications: PNV, ASA until 36 weeks, PO iron  ALL: NKDA    BP: 118-130/93  HR: 81-92  Gen: NAD, A&Ox3  Abd: non-tender, gravid  LE: no swelling or pitting edema  Skin: no excoriations or rashes  Pulm: no increased WOB  SVE: 1/long    FHT: Cat 2,  bpm, moderate variability overall reassuring, + accels reactive, + late decels with return to FHR baseline with repositioning.  [place]: Uterine irritability  TAUS: Cephalic, anterior/posterior placenta. U/S printed and attached to paper chart.     A/P: 33 yo  at 38w5d presenting for r/o PEC, found to have a NRFHT while in triage.   - IV fluids, NPO  - Admit to L&D  - Continuous FHT and toco monitoring. Currently reactive and reassuring.  - Prenatals reviewed. GBS negative. No indication for antibiotic prophylaxis.  - r/o PEC: Full PEC labs pending, ofirmev and reglan ordered for HA, monitor BP's  - Plan: Evaluate FHT. With Cat 1 tracing proceed with IOL for NRFHT with kinney balloon and pitocin, epidural upon patient request.  - Risks, benefits, and alternatives discussed. Consents obtained.    D/W Dr. Trey Leal, CEM    D/W TOY De GuzmanC HPI: 35 yo  at 38w5d presenting with a HA that started this morning. Patient states that she did not take Tylenol for relief and it still persists at a 5/10. Patient was seen in the ED and found to have a BP of 130/90 and was sent to OB triage for evaluation. Endorses FM. Denies VB, LOF, ctx, hx of elevated BP's, CP/COB, RUQ pain, acute LE edema, or visual changes. Upon patient interview, a 2 minute deceleration was noted, patient examined and was 1/long, repositioned to her L then R side, IV inserted, IVF started, and was confirmed vertex presentation on U/S.     ANTE: Spontaneous pregnancy. NIPT and anatomy scan WNL, L renal pyelectasis measuring 7.4mm noted on U/S 10/24. GCT passed. GBS negative. Denies HTN or thyroid disorders. EFW 2668g by U/S on 10/15, 3000g by [employer] today.    OB: G1 - current  GynHx: 2 cm anterior subserosal/intramural fibroid. Deniesovarian cysts, STI's, or abnormal PAP.  PMHx: Denies  PSurgHx: Denies  Medications: PNV, ASA until 36 weeks, PO iron  ALL: NKDA    BP: 118-130/93  HR: 81-92  Gen: NAD, A&Ox3  Abd: non-tender, gravid  LE: no swelling or pitting edema  Skin: no excoriations or rashes  Pulm: no increased WOB  SVE: 1/long    FHT: Cat 2,  bpm, moderate variability overall reassuring, + accels reactive, + late decels with return to FHR baseline with repositioning.  [place]: Uterine irritability  TAUS: Cephalic, anterior placenta. ROBINA 11 cm. U/S printed and attached to paper chart.     A/P: 35 yo  at 38w5d presenting for r/o PEC, found to have a NRFHT while in triage.   - IV fluids, NPO  - Admit to L&D  - Continuous FHT and toco monitoring. Currently reactive and reassuring.  - Prenatals reviewed. GBS negative. No indication for antibiotic prophylaxis.  - r/o PEC: Full PEC labs pending, ofirmev and reglan ordered for HA, monitor BP's  - Plan: Evaluate FHT. With Cat 1 tracing proceed with IOL for NRFHT with kinney balloon and pitocin, epidural upon patient request.  - Risks, benefits, and alternatives discussed. Consents obtained.    D/W Dr. Trey Leal PA-C HPI: 33 yo  at 38w5d presenting with a HA that started this morning. Patient states that she did not take Tylenol for relief and it has worsened when she tried to go to bed, persisting at a "pounding" 5/10, /90mmHg at home. Patient was seen in the ED and found to have a BP of 130/90 mmHg and was sent to OB triage for evaluation. Endorses FM. Denies VB, LOF, ctx, hx of elevated BP's, CP/COB, RUQ pain, acute LE edema, or visual changes. Upon patient interview, a 3 minute deceleration was noted, patient examined and was 1/long, repositioned to her L then R side, IV inserted, IVF started, and was confirmed vertex presentation on U/S.     ANTE: Spontaneous pregnancy. NIPT and anatomy scan WNL, L renal pyelectasis measuring 7.4mm noted on U/S 10/24. GCT passed. GBS negative. Denies HTN or thyroid disorders. EFW 2668g by U/S on 10/15, 3000g by leopolds today.    OB: G1 - current  GynHx: 2 cm anterior subserosal/intramural fibroid. Deniesovarian cysts, STI's, or abnormal PAP.  PMHx: Denies  PSurgHx: Denies  Medications: PNV, ASA until 36 weeks, PO iron  ALL: NKDA    BP: 118-130/93  HR: 81-92  Gen: NAD, A&Ox3  Abd: non-tender, gravid  LE: no swelling or pitting edema  Skin: no excoriations or rashes  Pulm: no increased WOB  SVE: 1/long    FHT: Cat 2,  bpm, moderate variability overall reassuring, + accels reactive, + late decels with return to FHR baseline with repositioning.  [place]: Uterine irritability  TAUS: Cephalic, anterior placenta. ROBINA 11 cm. U/S printed and attached to paper chart.     A/P: 33 yo  at 38w5d presenting for r/o PEC, found to have a NRFHT while in triage.   - IV fluids, NPO  - Admit to L&D  - Continuous FHT and toco monitoring. Currently reactive and reassuring.  - Prenatals reviewed. GBS negative. No indication for antibiotic prophylaxis.  - r/o PEC: Full PEC labs pending, ofirmev and reglan ordered for HA, monitor BP's  - Plan: Evaluate FHT. With Cat 1 tracing proceed with IOL for NRFHT with kinney balloon and pitocin, epidural upon patient request.  - Risks, benefits, and alternatives discussed. Consents obtained.    D/W TOY De GuzmanC HPI: 35 yo  at 38w5d presenting with a HA that started this morning. Patient states that she did not take Tylenol for relief and it has worsened when she tried to go to bed at 23:30, persisting at a "pounding" 5/10, /90mmHg  at home. Patient was seen in the ED and found to have a BP of 130/90 mmHg and was sent to OB triage for evaluation. Endorses FM. Denies VB, LOF, ctx, hx of elevated BP's, CP/COB, RUQ pain, acute LE edema, or visual changes. Upon patient interview, a 3 minute deceleration was noted, patient examined and was 1/long, repositioned to her L then R side, IV inserted, IVF started, and was confirmed vertex presentation on U/S.     ANTE: Spontaneous pregnancy. NIPT and anatomy scan WNL, L renal pyelectasis measuring 7.4mm noted on U/S 10/24. GCT passed. GBS negative. Denies HTN or thyroid disorders. EFW 2668g by U/S on 10/15, 3000g by leopolds today.    OB: G1 - current  GynHx: 2 cm anterior subserosal/intramural fibroid. Deniesovarian cysts, STI's, or abnormal PAP.  PMHx: Denies  PSurgHx: Denies  Medications: PNV, ASA until 36 weeks, PO iron  ALL: NKDA    BP: 118-130/93  HR: 81-92  Gen: NAD, A&Ox3  Abd: non-tender, gravid  LE: no swelling or pitting edema  Skin: no excoriations or rashes  Pulm: no increased WOB  SVE: 1/long    FHT: Cat 2,  bpm, moderate variability overall reassuring, + accels reactive, + late decels with return to FHR baseline with repositioning.  [place]: Uterine irritability  TAUS: Cephalic, anterior placenta. ROBINA 11 cm. U/S printed and attached to paper chart.     A/P: 35 yo  at 38w5d presenting for r/o PEC, found to have a NRFHT while in triage.   - IV fluids, NPO  - Admit to L&D  - Continuous FHT and toco monitoring. Currently reactive and reassuring.  - Prenatals reviewed. GBS negative. No indication for antibiotic prophylaxis.  - r/o PEC: Full PEC labs pending, ofirmev and reglan ordered for HA, monitor BP's  - Plan: Evaluate FHT. With Cat 1 tracing proceed with IOL for NRFHT with kinney balloon and pitocin, epidural upon patient request.  - Risks, benefits, and alternatives discussed. Consents obtained.    D/W Dr. Trey Leal, PA-C HPI: 33 yo  at 38w5d presenting with a HA that started this morning. Patient states that she did not take Tylenol for relief and it has worsened when she tried to go to bed at 23:30, persisting at a "pounding" 5/10, /90mmHg  at home. Patient was seen in the ED and found to have a BP of 130/90 mmHg and was sent to OB triage for evaluation. Endorses FM. Denies VB, LOF, ctx, hx of elevated BP's, CP/COB, RUQ pain, acute LE edema, or visual changes. Upon patient interview, a 3 minute deceleration was noted, patient examined and was 1/long, repositioned to her L then R side, IV inserted, IVF started, and was confirmed vertex presentation on U/S.     ANTE: Spontaneous pregnancy. NIPT and anatomy scan WNL, L renal pyelectasis measuring 7.4mm noted on U/S 10/24. GCT passed. GBS negative. Denies HTN or thyroid disorders. EFW 2668g by U/S on 10/15, 3000g by leopolds today.    OB: G1 - current  GynHx: 2 cm anterior subserosal/intramural fibroid. Deniesovarian cysts, STI's, or abnormal PAP.  PMHx: Denies  PSurgHx: Denies  Medications: PNV, ASA until 36 weeks, PO iron  ALL: NKDA    BP: 118-130/93  HR: 81-92  Gen: NAD, A&Ox3  Abd: non-tender, gravid  LE: no swelling or pitting edema  Skin: no excoriations or rashes  Pulm: no increased WOB  SVE: 1/long    FHT: Cat 2,  bpm, moderate variability overall reassuring, + accels reactive, + late decels with return to FHR baseline with repositioning.  [place]: Uterine irritability  TAUS: Cephalic, anterior placenta. ROBINA 11 cm. U/S printed and attached to paper chart.     A/P: 33 yo  at 38w5d presenting for r/o PEC, found to have a NRFHT while in triage.   - IV fluids, NPO  - Admit to L&D  - Continuous FHT and toco monitoring. Currently reactive and reassuring.  - Prenatals reviewed. GBS negative. No indication for antibiotic prophylaxis.  - r/o PEC: Full PEC labs pending, ofirmev and reglan ordered for HA, monitor BP's  - Plan: Evaluate FHT. With Cat 1 tracing proceed with IOL for NRFHT with kinney balloon and pitocin when patient agrees, epidural upon patient request.  - Risks, benefits, and alternatives discussed. Consent for vaginal delivery obtained, patient refused to sign consent for IOL upon admission.    D/W Dr. Trey Leal PA-C HPI: 33 yo  at 38w5d presenting with a HA that started this morning. Patient states that she did not take Tylenol for relief and it has worsened when she tried to go to bed at 23:30, persisting at a "pounding" 5/10, /90mmHg  at home. Patient was seen in the ED and found to have a BP of 130/90 mmHg and was sent to OB triage for evaluation. Endorses FM. Denies VB, LOF, ctx, hx of elevated BP's, CP/COB, RUQ pain, acute LE edema, or visual changes. Upon patient interview, a 4-5 minute deceleration was noted, patient examined and was 1/long, repositioned to her L then R side, IV inserted, IVF started, and was confirmed vertex presentation on U/S.     ANTE: Spontaneous pregnancy. NIPT and anatomy scan WNL, L renal pyelectasis measuring 7.4mm noted on U/S 10/24. GCT passed. GBS negative. Denies HTN or thyroid disorders. EFW 2668g by U/S on 10/15, 3000g by leopolds today.    OB: G1 - current  GynHx: 2 cm anterior subserosal/intramural fibroid. Deniesovarian cysts, STI's, or abnormal PAP.  PMHx: Denies  PSurgHx: Denies  Medications: PNV, ASA until 36 weeks, PO iron  ALL: NKDA    BP: 118-130/93  HR: 81-92  Gen: NAD, A&Ox3  Abd: non-tender, gravid  LE: no swelling or pitting edema  Skin: no excoriations or rashes  Pulm: no increased WOB  SVE: 1/long    FHT: Cat 2,  bpm, moderate variability overall reassuring, + accels reactive, + late decels with return to FHR baseline with repositioning.  [place]: Uterine irritability  TAUS: Cephalic, anterior placenta. ROBINA 11 cm. U/S printed and attached to paper chart.     A/P: 33 yo  at 38w5d presenting for r/o PEC, found to have a NRFHT while in triage.   - IV fluids, NPO  - Admit to L&D  - Continuous FHT and toco monitoring. Currently reactive and reassuring.  - Prenatals reviewed. GBS negative. No indication for antibiotic prophylaxis.  - r/o PEC: Full PEC labs pending, ofirmev and reglan ordered for HA, monitor BP's  - Plan: Evaluate FHT. With Cat 1 tracing proceed with IOL for NRFHT with kinney balloon and pitocin when patient agrees, epidural upon patient request.  - Risks, benefits, and alternatives discussed. Consent for vaginal delivery obtained, patient refused to sign consent for IOL upon admission.    D/W Dr. Trey Leal PA-C

## 2024-10-30 NOTE — ED ADULT NURSE NOTE - OBJECTIVE STATEMENT
38 weeks and 5 days pregnant pt. with c/o elevated BP at home, pt. states her BP was 130/90, she called her GYN doctor who told her to come for evaluation. pt. is in no distress, denies sob, pain, cramps, vaginal d/c or bleeding, h/a, n/v, vision changes. Lula Linares

## 2024-10-30 NOTE — ED ADULT NURSE NOTE - NSFALLUNIVINTERV_ED_ALL_ED
Bed/Stretcher in lowest position, wheels locked, appropriate side rails in place/Call bell, personal items and telephone in reach/Instruct patient to call for assistance before getting out of bed/chair/stretcher/Non-slip footwear applied when patient is off stretcher/[place] to call system/Physically safe environment - no spills, clutter or unnecessary equipment/Purposeful proactive rounding/Room/bathroom lighting operational, light cord in reach

## 2024-10-30 NOTE — OB RN TRIAGE NOTE - PAIN SCALE PREFERRED, PROFILE
Subjective:     Christian King is a 16 numerical 0-10

## 2024-10-30 NOTE — ED PROVIDER NOTE - PHYSICAL EXAMINATION
VITAL SIGNS: I have reviewed nursing notes and confirm.  CONSTITUTIONAL: Well appearing, in no acute distress.   SKIN:  warm and dry, no acute rash.   HEAD:  normocephalic, atraumatic.  EYES: EOM intact; conjunctiva and sclera clear.  ENT: No nasal discharge; airway clear.   NECK: Supple.  CARD: S1, S2, Regular rate and rhythm. /90 anxious.  RESP:  Clear to auscultation b/l, no wheezes, rales or rhonchi.  ABD: Normal bowel sounds; soft; non-distended; non-tender; no guarding/ rebound.  EXT: Normal ROM. No peripheral edema. Pulses intact and equal b/l.  NEURO: Alert, oriented, grossly unremarkable  PSYCH: Cooperative, mood and affect appropriate.

## 2024-10-30 NOTE — OB RN PATIENT PROFILE - FALL HARM RISK - UNIVERSAL INTERVENTIONS
Bed in lowest position, wheels locked, appropriate side rails in place/Call bell, personal items and telephone in reach/Instruct patient to call for assistance before getting out of bed or chair/Non-slip footwear when patient is out of bed/[place] to call system/Physically safe environment - no spills, clutter or unnecessary equipment/Purposeful Proactive Rounding/Room/bathroom lighting operational, light cord in reach

## 2024-10-30 NOTE — OB PROVIDER LABOR PROGRESS NOTE - NS_SUBJECTIVE/OBJECTIVE_OBGYN_ALL_OB_FT
130 mod robina +accel -decel  contractions ~3/10 min, difficult to determine with tocometry    VSS  pitocin at 2, cook balloon in place.    Continue uptitrating pitocin as tolerated 130 mod robina +accel -decel  contractions ~3/10 min, difficult to determine with tocometry    VSS  pitocin at 6, cook balloon in place.    Continue uptitrating pitocin as tolerated

## 2024-10-30 NOTE — OB RN DELIVERY SUMMARY - NSSELHIDDEN_OBGYN_ALL_OB_FT
[NS_DeliveryAttending1_OBGYN_ALL_OB_FT:[id number]],[NS_DeliveryAssist1_OBGYN_ALL_OB_FT:[id number]],[NS_DeliveryRN_OBGYN_ALL_OB_FT:[id number]],[NS_CirculateRN2_OBGYN_ALL_OB_FT:[id number]]

## 2024-10-30 NOTE — OB PROVIDER LABOR PROGRESS NOTE - NS_SUBJECTIVE/OBJECTIVE_OBGYN_ALL_OB_FT
VE attempted at 1800, patient unable to tolerate examination.  Balloon remains in place with gentle traction.  Patient requesting epidural prior to next examination.  Possible SROM 1740 clear.    130 mod robina +accel -decel  ctx q2-3 min   Cat I, reassuring fetal acid base status   To reexamine after epidural  Dr. Yang aware

## 2024-10-30 NOTE — OB PROVIDER LABOR PROGRESS NOTE - NS_SUBJECTIVE/OBJECTIVE_OBGYN_ALL_OB_FT
Balloon placed 1330, 80/tension  1/long  120 mod robina +prolonged accels -decels  no ctx, uterine irritability   Cat I reassuring fetal acid base status  Patient sensitive to exams, feeling cramping, taping balloon in 10 mins to tension then starting pitocin in 30 mins. RN aware.

## 2024-10-30 NOTE — OB PROVIDER LABOR PROGRESS NOTE - NS_SUBJECTIVE/OBJECTIVE_OBGYN_ALL_OB_FT
At bedside for 2min deceleration.  FHR recovered with repositioning.  Patient At bedside for 2min deceleration.  FHR recovered with repositioning.  Patient states she was sitting and asked if it was movement of the monitor.  Explained in the setting of another deceleration, we would recommend induction of labor.  She would like to discuss the plan with her attending.

## 2024-10-30 NOTE — OB PROVIDER LABOR PROGRESS NOTE - NS_SUBJECTIVE/OBJECTIVE_OBGYN_ALL_OB_FT
Patient seen at bedside for Cat 2 tracing. SVE unchanged, 6/70/-2, pitocin paused, patient repositioned to her R side with resolution of the FHT.

## 2024-10-30 NOTE — ED PROVIDER NOTE - TEMPLATE, MLM
Pt has an appt scheduled for 5/22/17. Med was refilled as requested.   
Wife called, Dr Olivia antonio. She states patient wants to cx his surgery after speaking to his cardiologist. He will be going elsewhere for surgery.   Melanie can be reached at  [phone] if needed. ty    
VIEW ALL

## 2024-10-31 ENCOUNTER — TRANSCRIPTION ENCOUNTER (OUTPATIENT)
Age: 34
End: 2024-10-31

## 2024-10-31 PROCEDURE — 88307 TISSUE EXAM BY PATHOLOGIST: CPT | Mod: 26

## 2024-10-31 DEVICE — ARISTA 3GR: Type: IMPLANTABLE DEVICE | Status: FUNCTIONAL

## 2024-10-31 DEVICE — INTERCEED 3 X 4": Type: IMPLANTABLE DEVICE | Status: FUNCTIONAL

## 2024-10-31 RX ORDER — DIPHENHYDRAMINE HCL 12.5MG/5ML
25 ELIXIR ORAL EVERY 6 HOURS
Refills: 0 | Status: DISCONTINUED | OUTPATIENT
Start: 2024-10-31 | End: 2024-11-03

## 2024-10-31 RX ORDER — ACETAMINOPHEN 500 MG
975 TABLET ORAL
Refills: 0 | Status: DISCONTINUED | OUTPATIENT
Start: 2024-10-31 | End: 2024-11-03

## 2024-10-31 RX ORDER — CEFAZOLIN SODIUM 1 G
2000 VIAL (EA) INJECTION ONCE
Refills: 0 | Status: COMPLETED | OUTPATIENT
Start: 2024-10-31 | End: 2024-10-31

## 2024-10-31 RX ORDER — IBUPROFEN 200 MG
600 TABLET ORAL EVERY 6 HOURS
Refills: 0 | Status: COMPLETED | OUTPATIENT
Start: 2024-10-31 | End: 2025-09-29

## 2024-10-31 RX ORDER — DIPHENOXYLATE HYDROCHLORIDE AND ATROPINE SULFATE 2.5; .025 MG/1; MG/1
1 TABLET ORAL ONCE
Refills: 0 | Status: DISCONTINUED | OUTPATIENT
Start: 2024-10-31 | End: 2024-10-31

## 2024-10-31 RX ORDER — MAGNESIUM HYDROXIDE 1200 MG/15ML
30 SUSPENSION ORAL
Refills: 0 | Status: DISCONTINUED | OUTPATIENT
Start: 2024-10-31 | End: 2024-11-03

## 2024-10-31 RX ORDER — OXYTOCIN IN D5W-0.2% SODIUM CL 15/250 ML
2 PLASTIC BAG, INJECTION (ML) INTRAVENOUS
Qty: 30 | Refills: 0 | Status: DISCONTINUED | OUTPATIENT
Start: 2024-10-31 | End: 2024-10-31

## 2024-10-31 RX ORDER — OXYCODONE HYDROCHLORIDE 30 MG/1
5 TABLET ORAL
Refills: 0 | Status: DISCONTINUED | OUTPATIENT
Start: 2024-10-31 | End: 2024-11-03

## 2024-10-31 RX ORDER — ENOXAPARIN SODIUM 40MG/0.4ML
40 SYRINGE (ML) SUBCUTANEOUS EVERY 24 HOURS
Refills: 0 | Status: DISCONTINUED | OUTPATIENT
Start: 2024-11-01 | End: 2024-11-03

## 2024-10-31 RX ORDER — AZITHROMYCIN DIHYDRATE 200 MG/5ML
500 POWDER, FOR SUSPENSION ORAL ONCE
Refills: 0 | Status: DISCONTINUED | OUTPATIENT
Start: 2024-10-31 | End: 2024-10-31

## 2024-10-31 RX ORDER — TRIAMCINOLONE ACETONIDE 400 MG/10ML
10 INJECTION, SUSPENSION INTRA-ARTICULAR; INTRAMUSCULAR ONCE
Refills: 0 | Status: DISCONTINUED | OUTPATIENT
Start: 2024-10-31 | End: 2024-10-31

## 2024-10-31 RX ORDER — CITRIC ACID/SODIUM CITRATE 334-500MG
30 SOLUTION, ORAL ORAL ONCE
Refills: 0 | Status: COMPLETED | OUTPATIENT
Start: 2024-10-31 | End: 2024-10-31

## 2024-10-31 RX ORDER — MODIFIED LANOLIN
1 OINTMENT (GRAM) TOPICAL EVERY 6 HOURS
Refills: 0 | Status: DISCONTINUED | OUTPATIENT
Start: 2024-10-31 | End: 2024-11-03

## 2024-10-31 RX ORDER — FAMOTIDINE 10 MG/ML
20 INJECTION INTRAVENOUS ONCE
Refills: 0 | Status: COMPLETED | OUTPATIENT
Start: 2024-10-31 | End: 2024-10-31

## 2024-10-31 RX ORDER — OXYCODONE HYDROCHLORIDE 30 MG/1
5 TABLET ORAL ONCE
Refills: 0 | Status: DISCONTINUED | OUTPATIENT
Start: 2024-10-31 | End: 2024-11-03

## 2024-10-31 RX ORDER — SIMETHICONE 80 MG/1
80 TABLET, CHEWABLE ORAL EVERY 4 HOURS
Refills: 0 | Status: DISCONTINUED | OUTPATIENT
Start: 2024-10-31 | End: 2024-11-03

## 2024-10-31 RX ORDER — OXYTOCIN IN D5W-0.2% SODIUM CL 15/250 ML
42 PLASTIC BAG, INJECTION (ML) INTRAVENOUS
Qty: 30 | Refills: 0 | Status: DISCONTINUED | OUTPATIENT
Start: 2024-10-31 | End: 2024-11-03

## 2024-10-31 RX ORDER — ACETAMINOPHEN 500 MG
1000 TABLET ORAL ONCE
Refills: 0 | Status: COMPLETED | OUTPATIENT
Start: 2024-10-31 | End: 2024-10-31

## 2024-10-31 RX ORDER — CLOSTRIDIUM TETANI TOXOID ANTIGEN (FORMALDEHYDE INACTIVATED), CORYNEBACTERIUM DIPHTHERIAE TOXOID ANTIGEN (FORMALDEHYDE INACTIVATED), BORDETELLA PERTUSSIS TOXOID ANTIGEN (GLUTARALDEHYDE INACTIVATED), BORDETELLA PERTUSSIS FILAMENTOUS HEMAGGLUTININ ANTIGEN (FORMALDEHYDE INACTIVATED), BORDETELLA PERTUSSIS PERTACTIN ANTIGEN, AND BORDETELLA PERTUSSIS FIMBRIAE 2/3 ANTIGEN 5; 2; 2.5; 5; 3; 5 [LF]/.5ML; [LF]/.5ML; UG/.5ML; UG/.5ML; UG/.5ML; UG/.5ML
0.5 INJECTION, SUSPENSION INTRAMUSCULAR ONCE
Refills: 0 | Status: DISCONTINUED | OUTPATIENT
Start: 2024-10-31 | End: 2024-11-03

## 2024-10-31 RX ORDER — BUPIVACAINE 13.3 MG/ML
20 INJECTION, SUSPENSION, LIPOSOMAL INFILTRATION ONCE
Refills: 0 | Status: DISCONTINUED | OUTPATIENT
Start: 2024-10-31 | End: 2024-10-31

## 2024-10-31 RX ORDER — KETOROLAC TROMETHAMINE 30 MG/ML
30 INJECTION INTRAMUSCULAR; INTRAVENOUS EVERY 6 HOURS
Refills: 0 | Status: DISCONTINUED | OUTPATIENT
Start: 2024-10-31 | End: 2024-11-01

## 2024-10-31 RX ORDER — CHLORHEXIDINE GLUCONATE 40 MG/ML
1 SOLUTION TOPICAL DAILY
Refills: 0 | Status: DISCONTINUED | OUTPATIENT
Start: 2024-10-31 | End: 2024-10-31

## 2024-10-31 RX ADMIN — Medication 2 MILLIUNIT(S)/MIN: at 01:11

## 2024-10-31 RX ADMIN — Medication 400 MILLIGRAM(S): at 00:13

## 2024-10-31 RX ADMIN — Medication 42 MILLIUNIT(S)/MIN: at 16:17

## 2024-10-31 RX ADMIN — CHLORHEXIDINE GLUCONATE 1 APPLICATION(S): 40 SOLUTION TOPICAL at 14:18

## 2024-10-31 RX ADMIN — KETOROLAC TROMETHAMINE 30 MILLIGRAM(S): 30 INJECTION INTRAMUSCULAR; INTRAVENOUS at 23:54

## 2024-10-31 RX ADMIN — Medication 100 MILLIGRAM(S): at 14:18

## 2024-10-31 RX ADMIN — FAMOTIDINE 104 MILLIGRAM(S): 10 INJECTION INTRAVENOUS at 14:18

## 2024-10-31 RX ADMIN — Medication 30 MILLILITER(S): at 14:18

## 2024-10-31 RX ADMIN — Medication 400 MILLIGRAM(S): at 16:22

## 2024-10-31 RX ADMIN — KETOROLAC TROMETHAMINE 30 MILLIGRAM(S): 30 INJECTION INTRAMUSCULAR; INTRAVENOUS at 20:25

## 2024-10-31 RX ADMIN — AZITHROMYCIN DIHYDRATE 255 MILLIGRAM(S): 200 POWDER, FOR SUSPENSION ORAL at 14:52

## 2024-10-31 RX ADMIN — DIPHENOXYLATE HYDROCHLORIDE AND ATROPINE SULFATE 1 TABLET(S): 2.5; .025 TABLET ORAL at 16:22

## 2024-10-31 NOTE — OB PROVIDER LABOR PROGRESS NOTE - NS_SUBJECTIVE/OBJECTIVE_OBGYN_ALL_OB_FT
Patient seen at bedside feeling increased pelvic pressure with ctx. SVE 6-7/70/-3. Top off requested at this time, patient repositioned to her R side with a peanut ball.

## 2024-10-31 NOTE — OB PROVIDER DELIVERY SUMMARY - NSPROVIDERDELIVERYNOTE_OBGYN_ALL_OB_FT
Patient 33yo  at 38+6 underwent IOL due to NRFHT. Patient ruled in for gHTN intrapartum. Arrest of descent at fully dilated and pushing >4 hours. Low transverse  section performed. Delivery of live male infant in cephalic presentation with nuchal cord x 2, reduced at hysterotomy. Uterus closed with 0 Vicryl suture in two layers. Con and intercede placed over uterus. Peritoneum closed with 2-0 Vicryl suture. Fascia closed with 1-0 Vicryl suture. SubQ closed with 2-0 plain gut. Skin closed with 3-0 Monocryl suture. EBL 900cc, IVF 1000cc, UOP 40cc.

## 2024-10-31 NOTE — OB PROVIDER LABOR PROGRESS NOTE - NS_SUBJECTIVE/OBJECTIVE_OBGYN_ALL_OB_FT
I have been by the bedside with patient  who took a break from pushing but has been attempting to push for 5 hours.   Reviewed with patient that the baby is still in the same location as when she started pushing. I again recommended a  and the patient agreed  Baby is now tachycardic but patient is afebrile.   pt was consented for primary    RBA reviewed including but not limited to pain, infection, damage to surrounding organs, and risk of hysterectomy  Consent obtained.   Staff notified and patient on call to OR

## 2024-10-31 NOTE — OB PROVIDER DELIVERY SUMMARY - NSSELHIDDEN_OBGYN_ALL_OB_FT
[NS_DeliveryAttending1_OBGYN_ALL_OB_FT:[id number]],[NS_DeliveryAssist1_OBGYN_ALL_OB_FT:[id number]],[NS_DeliveryRN_OBGYN_ALL_OB_FT:[id number]],[NS_CirculateRN2_OBGYN_ALL_OB_FT:[id number]]

## 2024-10-31 NOTE — OB PROVIDER DELIVERY SUMMARY - NSLOWPPHRISK_OBGYN_A_OB
No previous uterine incision/Manzo Pregnancy/Less than or equal to 4 previous vaginal births/No known bleeding disorder

## 2024-10-31 NOTE — OB PROVIDER LABOR PROGRESS NOTE - NS_SUBJECTIVE/OBJECTIVE_OBGYN_ALL_OB_FT
Patient seen at the bedside for increasing discomfort  While at the bedside deceleration to marbella of 70 x3 minutes  Patient examined and SVE: 7/70/-3

## 2024-10-31 NOTE — OB PROVIDER LABOR PROGRESS NOTE - NS_SUBJECTIVE/OBJECTIVE_OBGYN_ALL_OB_FT
I saw the patient at bedside, she is having pain with ctx, epidural restarted. She is fully and pushing for an hour and 45 minutes. Attending in house actively managing labor and pushing.  I offered the patient to push with her, the patient declines at this moment she says that she needs a break.   FHT : baseline 150bpm, moderate variability, no accels, + decels with ctx (mixed variables, lates), category 2 but overall reassuring  [place]: q2-3 min  Pitocin is on 4 miu  Plan to restart pushing soon

## 2024-10-31 NOTE — OB PROVIDER LABOR PROGRESS NOTE - NS_SUBJECTIVE/OBJECTIVE_OBGYN_ALL_OB_FT
140 mod robina -accel +occasional late decel  ctx 2-3/10 min     Cat II overall with reassuring fetal acid base status  pitocin at 7, continue as tolerated  Plan for Dr. Yang to place IU[facility]

## 2024-10-31 NOTE — OB PROVIDER LABOR PROGRESS NOTE - ASSESSMENT
- Dr. Yang to discuss plan with patient this morning
- epidural in situ  - titrate pitocin  - gHTN: monitor BP's and for toxic complaints  - will continue to monitor
Called attending to discuss tracing and tracing shown to attending on facetime. Discussion on whether baseline is 155 bpm with frequent late decelerations vs baseline 135 bpm with prolonged accelerations. Per attending, baseline is 135 bpm with prolonged accels and moderate variability. Will continue expectant management and check back in with attending on the tracing in 30 minutes. Patient agreed to take tylenol for her headache. Continue to monitor. BP currently normotensive and labs wnl. 
Cat 2 tracing, overall reassuring with moderate variability. Facetimed with attending again who was in agreement with assessment of the tracing. Continue expectant management at this time. 
- epidural in situ  - titrate pitocin as tolerated  -will continue to monitor
At bedside with patient and exam performed.   Stretched cervix to 10 cm and did trial pushes with patient   I explained to patient that despite now being 10 cm she would have to successfully push and that she could not spend 3 hours pushing with category 2 tracing with intermittent decelerations.   I agree reiterated to patient that I strongly recommended a primary  for persistent category 2 tracing understanding that she does not want a  I reminded her and her  that the wellbing of the baby at this moment is most important.   Pt continues to decline .   Pushed 3 times and decelerations down to the 60s noted. I again stressed to patient that I felt a  was indicated for fetal status and she refused.   Pitocin turned off during this time to allow baby to recover. 
- epidural in situ  - plan to restart pitocin  - will continue to monitor
- epidural in situ  - titrate pitocin as tolerated  - anesthesia notified for top off   - will continue to monitor
Patient repositioned  Will continue to monitor tracing for improvement  Tracing Cat II however with moderate variability overall reassuring at this time
- epidural in situ  - pitocin paused  - consider IUPC and amnioinfusion  - will continue to monitor
- epidural in situ  - titrate pitocin as tolerated  - r/o gHTN: monitor BP's  - will continue to monitor
Will adjust toco and uptitrate pitocin as tolerated  Tracing Cat I  
No significant past surgical history

## 2024-10-31 NOTE — OB PROVIDER LABOR PROGRESS NOTE - NS_OBIHICONTRACTIONPATTERNDETAILS_OBGYN_ALL_OB_FT
Dorys q3 minutes
Uterine irritability, need to readjust toco
CTX q 8 min
Dorys q4 minutes at bedside on 4 mU of pitocin, toco re-adjusted
ctx infrequent, 1 in 10
Dorys q3-5 minutes on 8 mU of pitocin.
Dorys q4-5 minutes on toco.
Irregular contractions
Dorys q3-4 minutes on 6 mU of pitocin.
ctx q4-6 minutes

## 2024-10-31 NOTE — OB NEONATOLOGY/PEDIATRICIAN DELIVERY SUMMARY - NSPEDSNEONOTESA_OBGYN_ALL_OB_FT
Infant born via c/s pale with decreased tone and resp. effort. Nuchal cord x2. Responded to tactile stim and manual bagging with fiO2 increased to 100% x 1 minute, with improvement in resp. effort, tone and cry at this time. Maintaining Sat's in r/a without cpap. Suctioned for lt mec stained fluid. PE noted to have molding and a cephalohematoma on the right temporal side of the head. PE otherwise wnl's.

## 2024-10-31 NOTE — OB PROVIDER LABOR PROGRESS NOTE - NS_SUBJECTIVE/OBJECTIVE_OBGYN_ALL_OB_FT
Patient pushing with Dr. Yang.     EFM reviewed. Baseline 150, moderate variability, no accelerations, recurrent late decelerations with pushing.   Category II tracing. Low concern for fetal acid base status given moderate variability.  Ctx 3-4min on toco    Pitocin at 4  Will continue to monitor patient closely.

## 2024-10-31 NOTE — OB PROVIDER LABOR PROGRESS NOTE - NS_OBIHIFHRDETAILS_OBGYN_ALL_OB_FT
Category 2
FHT Cat 1,  bpm, moderate variability, + accels, - decels.
FHT Cat 1,  bpm, moderate variability, + accels, - decels.
baseline 130, moderate variability, +accels, -decels; Cat I tracing
FHT Cat 1,  bpm, moderate variability, + accels, - decels.
FHT Cat 2,  bpm, moderate variability overall reassuring, + accels overall reactive, + nonrecurrent late decels.
FHT Cat 2,  bpm, moderate variability, + accels, - decels.
FHT Category I with moderate variability and accelerations, no decelerations.
baseline 135, moderate variability, -accels, +3 minutes decel to marbella of 70, +subsequent variable decelerations; Tracing Cat II
145 bpm, mod variability, + accels, nonrecurrent late decels present
FHT Cat 1,  bpm, moderate variability, + accels, - decels.

## 2024-10-31 NOTE — OB RN INTRAOPERATIVE NOTE - NSSELHIDDEN_OBGYN_ALL_OB_FT
[NS_DeliveryAttending1_OBGYN_ALL_OB_FT:[id number]],[NS_DeliveryAssist1_OBGYN_ALL_OB_FT:[id number]],[NS_DeliveryRN_OBGYN_ALL_OB_FT:[id number]],[NS_CirculateRN2_OBGYN_ALL_OB_FT:[id number]]

## 2024-10-31 NOTE — OB PROVIDER LABOR PROGRESS NOTE - NS_SUBJECTIVE/OBJECTIVE_OBGYN_ALL_OB_FT
EFM reviewed. Baseline 135, moderate variability, +accelerations, no decelerations. Category I tracing.   Ctx q4min.     Pitocin increased to 9, will continue to increase as tolerated.   VS reviewed. BPs normotensive to mild range, highest 140/80

## 2024-11-01 LAB
ANISOCYTOSIS BLD QL: SLIGHT — SIGNIFICANT CHANGE UP
BASOPHILS # BLD AUTO: 0 K/UL — SIGNIFICANT CHANGE UP (ref 0–0.2)
BASOPHILS NFR BLD AUTO: 0 % — SIGNIFICANT CHANGE UP (ref 0–2)
ELLIPTOCYTES BLD QL SMEAR: SIGNIFICANT CHANGE UP
EOSINOPHIL # BLD AUTO: 0 K/UL — SIGNIFICANT CHANGE UP (ref 0–0.5)
EOSINOPHIL NFR BLD AUTO: 0 % — SIGNIFICANT CHANGE UP (ref 0–6)
GIANT PLATELETS BLD QL SMEAR: PRESENT — SIGNIFICANT CHANGE UP
HCT VFR BLD CALC: 28.4 % — LOW (ref 34.5–45)
HGB BLD-MCNC: 9 G/DL — LOW (ref 11.5–15.5)
HYPOCHROMIA BLD QL: SIGNIFICANT CHANGE UP
LYMPHOCYTES # BLD AUTO: 0.29 K/UL — LOW (ref 1–3.3)
LYMPHOCYTES # BLD AUTO: 1.7 % — LOW (ref 13–44)
MACROCYTES BLD QL: SLIGHT — SIGNIFICANT CHANGE UP
MANUAL SMEAR VERIFICATION: SIGNIFICANT CHANGE UP
MCHC RBC-ENTMCNC: 27.2 PG — SIGNIFICANT CHANGE UP (ref 27–34)
MCHC RBC-ENTMCNC: 31.7 G/DL — LOW (ref 32–36)
MCV RBC AUTO: 85.8 FL — SIGNIFICANT CHANGE UP (ref 80–100)
MICROCYTES BLD QL: SLIGHT — SIGNIFICANT CHANGE UP
MONOCYTES # BLD AUTO: 0.29 K/UL — SIGNIFICANT CHANGE UP (ref 0–0.9)
MONOCYTES NFR BLD AUTO: 1.7 % — LOW (ref 2–14)
NEUTROPHILS # BLD AUTO: 16.74 K/UL — HIGH (ref 1.8–7.4)
NEUTROPHILS NFR BLD AUTO: 95.7 % — HIGH (ref 43–77)
NEUTS BAND # BLD: 0.9 % — SIGNIFICANT CHANGE UP (ref 0–8)
OVALOCYTES BLD QL SMEAR: SLIGHT — SIGNIFICANT CHANGE UP
PLAT MORPH BLD: ABNORMAL
PLATELET # BLD AUTO: 154 K/UL — SIGNIFICANT CHANGE UP (ref 150–400)
POIKILOCYTOSIS BLD QL AUTO: SIGNIFICANT CHANGE UP
POLYCHROMASIA BLD QL SMEAR: SLIGHT — SIGNIFICANT CHANGE UP
RBC # BLD: 3.31 M/UL — LOW (ref 3.8–5.2)
RBC # FLD: 14 % — SIGNIFICANT CHANGE UP (ref 10.3–14.5)
RBC BLD AUTO: ABNORMAL
SCHISTOCYTES BLD QL AUTO: SLIGHT — SIGNIFICANT CHANGE UP
SPHEROCYTES BLD QL SMEAR: SLIGHT — SIGNIFICANT CHANGE UP
WBC # BLD: 17.33 K/UL — HIGH (ref 3.8–10.5)
WBC # FLD AUTO: 17.33 K/UL — HIGH (ref 3.8–10.5)

## 2024-11-01 PROCEDURE — 76642 ULTRASOUND BREAST LIMITED: CPT | Mod: 26,LT

## 2024-11-01 RX ORDER — IBUPROFEN 200 MG
600 TABLET ORAL EVERY 6 HOURS
Refills: 0 | Status: DISCONTINUED | OUTPATIENT
Start: 2024-11-01 | End: 2024-11-03

## 2024-11-01 RX ADMIN — KETOROLAC TROMETHAMINE 30 MILLIGRAM(S): 30 INJECTION INTRAMUSCULAR; INTRAVENOUS at 00:28

## 2024-11-01 RX ADMIN — Medication 40 MILLIGRAM(S): at 09:17

## 2024-11-01 RX ADMIN — Medication 600 MILLIGRAM(S): at 23:55

## 2024-11-01 RX ADMIN — KETOROLAC TROMETHAMINE 30 MILLIGRAM(S): 30 INJECTION INTRAMUSCULAR; INTRAVENOUS at 06:58

## 2024-11-01 RX ADMIN — KETOROLAC TROMETHAMINE 30 MILLIGRAM(S): 30 INJECTION INTRAMUSCULAR; INTRAVENOUS at 12:46

## 2024-11-01 RX ADMIN — Medication 600 MILLIGRAM(S): at 17:48

## 2024-11-01 RX ADMIN — Medication 975 MILLIGRAM(S): at 09:17

## 2024-11-01 RX ADMIN — Medication 975 MILLIGRAM(S): at 03:57

## 2024-11-01 RX ADMIN — KETOROLAC TROMETHAMINE 30 MILLIGRAM(S): 30 INJECTION INTRAMUSCULAR; INTRAVENOUS at 06:26

## 2024-11-01 RX ADMIN — Medication 975 MILLIGRAM(S): at 14:56

## 2024-11-01 RX ADMIN — Medication 975 MILLIGRAM(S): at 21:31

## 2024-11-01 RX ADMIN — Medication 975 MILLIGRAM(S): at 03:35

## 2024-11-01 NOTE — PROGRESS NOTE ADULT - SUBJECTIVE AND OBJECTIVE BOX
Patient evaluated at bedside this morning, resting comfortable in bed, with no acute events overnight.  She reports pain is well controlled with oral pain medications. She had some clear liquids overnight with no nausea or vomiting.   She denies headache, dizziness, chest pain, palpitations, shortness of breath or heavy vaginal bleeding.  She has not tried ambulating since procedure, kinney remains in place at this time.    Physical Exam:  Vital Signs Last 24 Hrs  T(C): 36.3 (01 Nov 2024 05:41), Max: 37.9 (31 Oct 2024 14:15)  T(F): 97.4 (01 Nov 2024 05:41), Max: 100.2 (31 Oct 2024 14:15)  HR: 86 (01 Nov 2024 05:41) (72 - 98)  BP: 115/71 (01 Nov 2024 05:41) (106/61 - 136/87)  BP(mean): 105 (31 Oct 2024 20:00) (78 - 106)  RR: 16 (01 Nov 2024 05:41) (15 - 19)  SpO2: 95% (01 Nov 2024 05:41) (95% - 100%)    Parameters below as of 31 Oct 2024 08:15  Patient On (Oxygen Delivery Method): room air        GA: NAD, A+0 x 3  Pulm: no increased WOB  Abd: soft, nontender, nondistended, no rebound or guarding, incision clean, dry and intact  : kinney in situ, lochia WNL  Extremities: no calf tenderness, SCDs in place                  acetaminophen     Tablet .. 975 milliGRAM(s) Oral <User Schedule>  diphenhydrAMINE 25 milliGRAM(s) Oral every 6 hours PRN  diphtheria/tetanus/pertussis (acellular) Vaccine (Adacel) 0.5 milliLiter(s) IntraMuscular once  enoxaparin Injectable 40 milliGRAM(s) SubCutaneous every 24 hours  ibuprofen  Tablet. 600 milliGRAM(s) Oral every 6 hours  ketorolac   Injectable 30 milliGRAM(s) IV Push every 6 hours  lactated ringers. 1000 milliLiter(s) IV Continuous <Continuous>  lanolin Ointment 1 Application(s) Topical every 6 hours PRN  magnesium hydroxide Suspension 30 milliLiter(s) Oral two times a day PRN  oxyCODONE    IR 5 milliGRAM(s) Oral every 3 hours PRN  oxyCODONE    IR 5 milliGRAM(s) Oral once PRN  oxytocin Infusion 42 milliUNIT(s)/Min IV Continuous <Continuous>  simethicone 80 milliGRAM(s) Chew every 4 hours PRN

## 2024-11-01 NOTE — LACTATION INITIAL EVALUATION - LACTATION INTERVENTIONS
initiate/review pumping guidelines and safe milk handling/initiate/review supplementation plan due to medical indications/review techniques to increase milk supply/reviewed components of an effective feeding and at least 8 effective feedings per day required/reviewed importance of monitoring infant diapers, the breastfeeding log, and minimum output each day/reviewed feeding on demand/by cue at least 8 times a day/recommended follow-up with pediatrician within 24 hours of discharge

## 2024-11-01 NOTE — LACTATION INITIAL EVALUATION - NS LACT CON REASON FOR REQ
Meredith Giordano is a 34 year old parent of a 38.6 week SGA infant. LC to bedside to discuss progress of breastfeeding and provide support on PPD1. Patient states that she plans to breastfeed. She denies any chronic medical conditions or history of breast/chest surgery. On assessment, breasts are round, soft, symmetric. ?Small area of non-tender dense breast tissue vs cystic structure palpated to lateral aspect of left breast---MD aware via RN.  Nipples intact and everted, +transitional milk. At time of assessment, baby on triple phototx for katarzyna+/hyperbili.   PLAN: Provide as much skin to skin as safely able, continue nursing on demand at least Q2-3h, pump x 10-15 minutes until baby dc phototx, supplement with formula as determined by pediatric team, monitor diaper output, follow up with pediatrician, outpatient IBCLC PRN.   EDUCATION:  *Normal  feeding/behavior patterns   *Infant feeding cues and strategies to feed a sleepy baby   *Stimulate breasts & feed baby Q2-3h, keys to an adequate supply  *Signs of satiety & adequate  intake/output such as wet/dirty diapers and weight.   Patient verbalized understanding of education and how/when to follow up. All questions answered at this time, and LC available via RN.

## 2024-11-02 RX ADMIN — Medication 975 MILLIGRAM(S): at 21:11

## 2024-11-02 RX ADMIN — Medication 975 MILLIGRAM(S): at 15:00

## 2024-11-02 RX ADMIN — Medication 600 MILLIGRAM(S): at 11:58

## 2024-11-02 RX ADMIN — Medication 40 MILLIGRAM(S): at 08:48

## 2024-11-02 RX ADMIN — Medication 600 MILLIGRAM(S): at 18:13

## 2024-11-02 RX ADMIN — Medication 975 MILLIGRAM(S): at 03:00

## 2024-11-02 RX ADMIN — Medication 975 MILLIGRAM(S): at 08:47

## 2024-11-02 RX ADMIN — Medication 600 MILLIGRAM(S): at 06:40

## 2024-11-02 NOTE — PROGRESS NOTE ADULT - SUBJECTIVE AND OBJECTIVE BOX
Patient evaluated at bedside this morning, resting comfortable in bed.   She reports pain is well controlled with oral pain medications.   She denies headache, dizziness, chest pain, palpitations, shortness of breath, nausea, vomiting or heavy vaginal bleeding.  She has been ambulating without assistance, voiding spontaneously, passing gas, tolerating regular diet.     Physical Exam:  Vital Signs Last 24 Hrs  T(C): 36.4 (02 Nov 2024 01:47), Max: 36.7 (01 Nov 2024 09:17)  T(F): 97.5 (02 Nov 2024 01:47), Max: 98 (01 Nov 2024 09:17)  HR: 86 (02 Nov 2024 01:47) (77 - 86)  BP: 136/85 (02 Nov 2024 01:47) (121/77 - 136/85)  BP(mean): --  RR: 16 (02 Nov 2024 01:47) (16 - 18)  SpO2: 97% (02 Nov 2024 01:47) (96% - 98%)    Parameters below as of 02 Nov 2024 01:47  Patient On (Oxygen Delivery Method): room air        GA: NAD, A+0 x 3  Pulm: no increased WOB  Abd: soft, nontender, nondistended, no rebound or guarding, incision clean, dry and intact, uterus firm at midline, 2 fb below umbilicus  Extremities: no swelling or calf tenderness                             9.0    17.33 )-----------( 154      ( 01 Nov 2024 05:30 )             28.4

## 2024-11-03 VITALS
RESPIRATION RATE: 18 BRPM | TEMPERATURE: 98 F | OXYGEN SATURATION: 100 % | DIASTOLIC BLOOD PRESSURE: 88 MMHG | SYSTOLIC BLOOD PRESSURE: 139 MMHG | HEART RATE: 70 BPM

## 2024-11-03 PROCEDURE — 76642 ULTRASOUND BREAST LIMITED: CPT

## 2024-11-03 PROCEDURE — 85384 FIBRINOGEN ACTIVITY: CPT

## 2024-11-03 PROCEDURE — 85610 PROTHROMBIN TIME: CPT

## 2024-11-03 PROCEDURE — 36415 COLL VENOUS BLD VENIPUNCTURE: CPT

## 2024-11-03 PROCEDURE — 85025 COMPLETE CBC W/AUTO DIFF WBC: CPT

## 2024-11-03 PROCEDURE — 80053 COMPREHEN METABOLIC PANEL: CPT

## 2024-11-03 PROCEDURE — 84156 ASSAY OF PROTEIN URINE: CPT

## 2024-11-03 PROCEDURE — 86901 BLOOD TYPING SEROLOGIC RH(D): CPT

## 2024-11-03 PROCEDURE — 84550 ASSAY OF BLOOD/URIC ACID: CPT

## 2024-11-03 PROCEDURE — 88307 TISSUE EXAM BY PATHOLOGIST: CPT

## 2024-11-03 PROCEDURE — 85730 THROMBOPLASTIN TIME PARTIAL: CPT

## 2024-11-03 PROCEDURE — 86780 TREPONEMA PALLIDUM: CPT

## 2024-11-03 PROCEDURE — 59050 FETAL MONITOR W/REPORT: CPT

## 2024-11-03 PROCEDURE — 86900 BLOOD TYPING SEROLOGIC ABO: CPT

## 2024-11-03 PROCEDURE — 86850 RBC ANTIBODY SCREEN: CPT

## 2024-11-03 PROCEDURE — C1765: CPT

## 2024-11-03 PROCEDURE — 83615 LACTATE (LD) (LDH) ENZYME: CPT

## 2024-11-03 PROCEDURE — 82570 ASSAY OF URINE CREATININE: CPT

## 2024-11-03 PROCEDURE — C1889: CPT

## 2024-11-03 RX ORDER — IBUPROFEN 200 MG
1 TABLET ORAL
Qty: 0 | Refills: 0 | DISCHARGE
Start: 2024-11-03

## 2024-11-03 RX ORDER — NIFEDIPINE 90 MG
30 TABLET, EXTENDED RELEASE 24 HR ORAL ONCE
Refills: 0 | Status: COMPLETED | OUTPATIENT
Start: 2024-11-03 | End: 2024-11-03

## 2024-11-03 RX ORDER — ACETAMINOPHEN 500 MG
3 TABLET ORAL
Qty: 0 | Refills: 0 | DISCHARGE
Start: 2024-11-03

## 2024-11-03 RX ADMIN — Medication 40 MILLIGRAM(S): at 09:24

## 2024-11-03 RX ADMIN — Medication 975 MILLIGRAM(S): at 09:24

## 2024-11-03 RX ADMIN — Medication 600 MILLIGRAM(S): at 18:06

## 2024-11-03 RX ADMIN — Medication 30 MILLIGRAM(S): at 15:22

## 2024-11-03 RX ADMIN — Medication 600 MILLIGRAM(S): at 12:14

## 2024-11-03 RX ADMIN — Medication 975 MILLIGRAM(S): at 10:24

## 2024-11-03 RX ADMIN — Medication 600 MILLIGRAM(S): at 06:07

## 2024-11-03 RX ADMIN — Medication 600 MILLIGRAM(S): at 13:14

## 2024-11-03 RX ADMIN — Medication 975 MILLIGRAM(S): at 15:11

## 2024-11-03 RX ADMIN — Medication 975 MILLIGRAM(S): at 20:44

## 2024-11-03 RX ADMIN — Medication 600 MILLIGRAM(S): at 00:28

## 2024-11-03 RX ADMIN — Medication 975 MILLIGRAM(S): at 03:31

## 2024-11-03 NOTE — DISCHARGE NOTE OB - NS MD DC FALL RISK RISK
For information on Fall & Injury Prevention, visit: [email or web address] OR  [email or web address] OR  https://www.cdc.gov/steadi/patient.html

## 2024-11-03 NOTE — DISCHARGE NOTE OB - PLAN OF CARE
Follow up within your OB in one week for a blood pressure check. Check bp 3x a day. If blood pressure greater than 160/110, you develop a headache not relieved by tylenol, visual disturbances, or right upper abdominal pain, call your doctor or the hospital, or go to your nearest emergency room. Regular diet, normal activity, nothing in the vagina for 6 weeks--no sex, tampons, tub baths, or swimming pools. Take Motrin 600mg every 6 hours and/or tylenol 1000mg every 6 hours as needed for pain. Call your Doctor  to schedule a follow up appointment for 2 weeks. Call your Doctor if you experience severe abdominal pain not improved by oral pain medications, heavy bright red vaginal bleeding saturating more than 1 pad per hour, or fever greater than 100.4F. Nothing per vagina for 6 weeks. Follow up within your OB in one week for a blood pressure check. Check bp 3x a day. If blood pressure greater than 160/110, you develop a headache not relieved by tylenol, visual disturbances, or right upper abdominal pain, call your doctor or the hospital, or go to your nearest emergency room. Regular diet, normal activity, nothing in the vagina for 6 weeks--no sex, tampons, tub baths, or swimming pools.    Continue to take nifedipine 30 mg once daily. Take BP before meds, if BP <110/60 or HR > 110 do not take medication. Take Motrin 600mg every 6 hours and/or tylenol 975mg every 6 hours as needed for pain. Call your Doctor  to schedule a follow up appointment for 2 weeks. Call your Doctor if you experience severe abdominal pain not improved by oral pain medications, heavy bright red vaginal bleeding saturating more than 1 pad per hour, or fever greater than 100.4F. Nothing per vagina for 6 weeks.

## 2024-11-03 NOTE — DISCHARGE NOTE OB - FINANCIAL ASSISTANCE
[facility] provides services at a reduced cost to those who are determined to be eligible through [facility]’s financial assistance program. Information regarding [facility]’s financial assistance program can be found by going to [email or web address] or by calling [phone].

## 2024-11-03 NOTE — DISCHARGE NOTE OB - CARE PLAN
Principal Discharge DX:	S/P  section  Assessment and plan of treatment:	Take Motrin 600mg every 6 hours and/or tylenol 1000mg every 6 hours as needed for pain. Call your Doctor  to schedule a follow up appointment for 2 weeks. Call your Doctor if you experience severe abdominal pain not improved by oral pain medications, heavy bright red vaginal bleeding saturating more than 1 pad per hour, or fever greater than 100.4F. Nothing per vagina for 6 weeks.  Secondary Diagnosis:	Gestational hypertension, third trimester  Assessment and plan of treatment:	Follow up within your OB in one week for a blood pressure check. Check bp 3x a day. If blood pressure greater than 160/110, you develop a headache not relieved by tylenol, visual disturbances, or right upper abdominal pain, call your doctor or the hospital, or go to your nearest emergency room. Regular diet, normal activity, nothing in the vagina for 6 weeks--no sex, tampons, tub baths, or swimming pools.   1 Principal Discharge DX:	S/P  section  Assessment and plan of treatment:	Take Motrin 600mg every 6 hours and/or tylenol 1000mg every 6 hours as needed for pain. Call your Doctor  to schedule a follow up appointment for 2 weeks. Call your Doctor if you experience severe abdominal pain not improved by oral pain medications, heavy bright red vaginal bleeding saturating more than 1 pad per hour, or fever greater than 100.4F. Nothing per vagina for 6 weeks.  Secondary Diagnosis:	Gestational hypertension, third trimester  Assessment and plan of treatment:	Follow up within your OB in one week for a blood pressure check. Check bp 3x a day. If blood pressure greater than 160/110, you develop a headache not relieved by tylenol, visual disturbances, or right upper abdominal pain, call your doctor or the hospital, or go to your nearest emergency room. Regular diet, normal activity, nothing in the vagina for 6 weeks--no sex, tampons, tub baths, or swimming pools.    Continue to take nifedipine 30 mg once daily. Take BP before meds, if BP <110/60 or HR > 110 do not take medication.   Principal Discharge DX:	S/P  section  Assessment and plan of treatment:	Take Motrin 600mg every 6 hours and/or tylenol 975mg every 6 hours as needed for pain. Call your Doctor  to schedule a follow up appointment for 2 weeks. Call your Doctor if you experience severe abdominal pain not improved by oral pain medications, heavy bright red vaginal bleeding saturating more than 1 pad per hour, or fever greater than 100.4F. Nothing per vagina for 6 weeks.  Secondary Diagnosis:	Gestational hypertension, third trimester  Assessment and plan of treatment:	Follow up within your OB in one week for a blood pressure check. Check bp 3x a day. If blood pressure greater than 160/110, you develop a headache not relieved by tylenol, visual disturbances, or right upper abdominal pain, call your doctor or the hospital, or go to your nearest emergency room. Regular diet, normal activity, nothing in the vagina for 6 weeks--no sex, tampons, tub baths, or swimming pools.    Continue to take nifedipine 30 mg once daily. Take BP before meds, if BP <110/60 or HR > 110 do not take medication.

## 2024-11-03 NOTE — PROGRESS NOTE ADULT - SUBJECTIVE AND OBJECTIVE BOX
Patient evaluated at bedside this morning, resting comfortable in bed. She is having some abdominal pain but is able to tolerate diet, ambulate. She is passing flatus.  She reports pain is well controlled with oral pain medications.   She denies heavy vaginal bleeding.  She has been ambulating without assistance, voiding spontaneously.    Physical Exam:  Vital Signs Last 24 Hrs  T(C): 36.6 (03 Nov 2024 06:00), Max: 37.1 (02 Nov 2024 22:00)  T(F): 97.9 (03 Nov 2024 06:00), Max: 98.8 (02 Nov 2024 22:00)  HR: 61 (03 Nov 2024 06:00) (60 - 79)  BP: 146/84 (03 Nov 2024 06:00) (122/79 - 146/84)  BP(mean): --  RR: 18 (03 Nov 2024 06:00) (16 - 18)  SpO2: 100% (03 Nov 2024 06:00) (98% - 100%)    Parameters below as of 02 Nov 2024 22:00  Patient On (Oxygen Delivery Method): room air        GA: well-appearing, NAD  Pulm: no increased WOB  Abd: soft, nontender, no rebound or guarding, incision clean, dry and intact, uterus firm at midline,  fb below umbilicus  Extremities: no calf tenderness

## 2024-11-03 NOTE — DISCHARGE NOTE OB - HOSPITAL COURSE
33 y/o now  s/p pCS at 38w6d for arrest of descent, c/b gHTN. In postpartum period, she was normotensive and denied toxic complaints. She was found to have a left breast lump; L breast ultrasound showed a prelim read of no evidence of breast abscess. Patient aware of need to follow up with breast specialist. On POD3, she met all postpartum milestones including pain well controlled, tolerating food/water without nausea/vomiting, walking without dizziness, and voiding spontaneously. At time of discharge, she was clinically and hemodynamically stable for discharge.

## 2024-11-03 NOTE — DISCHARGE NOTE OB - PATIENT PORTAL LINK FT
You can access the FollowMyHealth Patient Portal offered by [facility] by registering at the following website: [email or web address]. By joining [facility]’s FollowMyHealth portal, you will also be able to view your health information using other applications (apps) compatible with our system.

## 2024-11-03 NOTE — DISCHARGE NOTE OB - MEDICATION SUMMARY - MEDICATIONS TO TAKE
I will START or STAY ON the medications listed below when I get home from the hospital:    ibuprofen 600 mg oral tablet  -- 1 tab(s) by mouth every 6 hours  -- Indication: For Postpartum    acetaminophen 325 mg oral tablet  -- 3 tab(s) by mouth every 6 hours  -- Indication: For Postpartum   I will START or STAY ON the medications listed below when I get home from the hospital:    ibuprofen 600 mg oral tablet  -- 1 tab(s) by mouth every 6 hours  -- Indication: For Pain    acetaminophen 325 mg oral tablet  -- 3 tab(s) by mouth every 6 hours  -- Indication: For Pain

## 2024-11-03 NOTE — PROGRESS NOTE ADULT - ASSESSMENT
34y Female POD#1  s/p primary C/S for arrest of descent at 38+6 after IOL for NRFHT, complicated by gestational hypertension                               - Gestational hypertension. Patient has been normotensive. Denies toxic complaints. Has not required antihypertensive medications.   - Neuro/Pain:  toradol atc, tylenol atc, oxy prn  - CV:  VS per routine. Starting Hgb 12.3. POD1 CBC pending.   - Pulm: Encourage ISS & Ambulation  - GI: regular diet   - : Rios in place, to be removed this morning, TOV this afternoon  - DVT ppx: SCDs, Lovenox 40mg QD  - Dispo: POD #2/3    [place] PGY2 
A/P: 34y s/p  section, POD#2 w/ gHTN, stable  -  Pain: PO motrin q6hrs, tylenol q8hrs, oxycodone for severe pain PRN  -  Post-operatively labs: post-op Hgb , hemodynamically stable, no symptoms of anemia   -  GI: tolerating regular diet, passing gas  -  : s/p kinney , urinating without difficulty  -  DVT prophylaxis: encouraged increased ambulation, SCDs, SQL  -  Dispo: POD 3 or 4    gHTN  -no toxic complaints  -normotensive 
A/P: 34y POD3 s/p primary  section for arrest of descent, complicated by gestational HTN, stable  -  Pain: PO motrin q6hrs, tylenol q8hrs, oxycodone for severe pain PRN  - gestational HTN: normotensive to mild range overnight, consider starting antihypertensives if persistently mildrange   -  Post-operatively labs: post-op Hgb 9.0, hemodynamically stable, acute blood loss anemia, asymptomatic  -  GI: tolerating regular diet, passing gas  -  : s/p kinney , urinating without difficulty  -  DVT prophylaxis: encouraged increased ambulation, SCDs, SQL  -  Dispo: POD 3 or 4

## 2024-11-03 NOTE — DISCHARGE NOTE OB - CARE PROVIDER_API CALL
Kallie Yang  Obstetrics and Gynecology  [address]  [place] [address]  Phone: [phone]  Fax: [phone]  Follow Up Time:

## 2024-11-04 LAB — T PALLIDUM AB TITR SER: NEGATIVE — SIGNIFICANT CHANGE UP

## 2024-11-05 ENCOUNTER — APPOINTMENT (OUTPATIENT)
Dept: ANTEPARTUM | Facility: CLINIC | Age: 34
End: 2024-11-05

## 2024-11-18 LAB — SURGICAL PATHOLOGY STUDY: SIGNIFICANT CHANGE UP

## 2024-12-31 NOTE — ED ADULT TRIAGE NOTE - HEIGHT IN CM
[facility] provides services at a reduced cost to those who are determined to be eligible through [facility]’s financial assistance program. Information regarding [facility]’s financial assistance program can be found by going to [email or web address] or by calling [phone]

## 2025-03-13 NOTE — CHART NOTE - NSCHARTNOTEFT_GEN_A_CORE
Pt seen at bedside due to genital wart noted by RN.    On exam, pt has one bullous<0.5cm lesion on L labia and 2 smaller warts on R buttocks. She states they appeared a few weeks ago and have never been painful. They are nonpainful to palpation today. She denies a history of HSV or HPV.    Speculum exam performed with no suspicious lesions in vaignal vault or cervix however limited due to ROM and cervical dilation.    At this time low suspicion for HSV given nonpainful and singular lesion, nonclustered and non ulcerated.    Plan to continue IOL at this time    [place] PGY3 Pt seen at bedside due to genital wart noted by RN.    On exam, pt has one bullous<0.5cm lesion on L labia and 2 smaller warts on R buttocks. She states they appeared a few weeks ago and have never been painful. They are nonpainful to palpation today. She denies a history of HSV or HPV.    Speculum exam performed with no suspicious lesions in vaignal vault or cervix however limited due to ROM and cervical dilation.    At this time low suspicion for HSV given nonpainful and singular lesion, nonclustered and non ulcerated.    Plan to continue IOL at this time    [place] PGY3 discussed with Dr Yang 0 (no pain/absence of nonverbal indicators of pain)

## 2025-07-30 NOTE — OB RN DELIVERY SUMMARY - NS_[facility]_OBGYN_ALL_OB
Yes